# Patient Record
Sex: MALE | Race: WHITE | NOT HISPANIC OR LATINO | Employment: FULL TIME | URBAN - NONMETROPOLITAN AREA
[De-identification: names, ages, dates, MRNs, and addresses within clinical notes are randomized per-mention and may not be internally consistent; named-entity substitution may affect disease eponyms.]

---

## 2023-10-12 PROBLEM — S43.431A TEAR OF RIGHT GLENOID LABRUM: Status: ACTIVE | Noted: 2023-10-12

## 2023-10-12 PROBLEM — S46.011A TRAUMATIC INCOMPLETE TEAR OF RIGHT ROTATOR CUFF: Status: ACTIVE | Noted: 2023-10-12

## 2023-10-13 ENCOUNTER — APPOINTMENT (OUTPATIENT)
Dept: ORTHOPEDIC SURGERY | Facility: CLINIC | Age: 38
End: 2023-10-13
Payer: OTHER GOVERNMENT

## 2023-12-11 ENCOUNTER — EVALUATION (OUTPATIENT)
Dept: PHYSICAL THERAPY | Facility: CLINIC | Age: 38
End: 2023-12-11
Payer: OTHER GOVERNMENT

## 2023-12-11 DIAGNOSIS — R60.0 EDEMA OF RIGHT UPPER EXTREMITY: ICD-10-CM

## 2023-12-11 DIAGNOSIS — M25.511 RIGHT SHOULDER PAIN: Primary | ICD-10-CM

## 2023-12-11 DIAGNOSIS — M75.20 BICEPS TENDINITIS: ICD-10-CM

## 2023-12-11 DIAGNOSIS — Z98.890 STATUS POST ROTATOR CUFF REPAIR: ICD-10-CM

## 2023-12-11 PROCEDURE — 97110 THERAPEUTIC EXERCISES: CPT | Mod: GP | Performed by: PHYSICAL THERAPIST

## 2023-12-11 PROCEDURE — 97161 PT EVAL LOW COMPLEX 20 MIN: CPT | Mod: GP | Performed by: PHYSICAL THERAPIST

## 2023-12-11 ASSESSMENT — PAIN - FUNCTIONAL ASSESSMENT: PAIN_FUNCTIONAL_ASSESSMENT: 0-10

## 2023-12-11 ASSESSMENT — PAIN SCALES - GENERAL: PAINLEVEL_OUTOF10: 5 - MODERATE PAIN

## 2023-12-11 ASSESSMENT — ENCOUNTER SYMPTOMS
DEPRESSION: 0
OCCASIONAL FEELINGS OF UNSTEADINESS: 0
LOSS OF SENSATION IN FEET: 0

## 2023-12-11 NOTE — PROGRESS NOTES
Physical Therapy Evaluation and Treatment      Patient Name: Nik Cota  MRN: [id number]  Today's Date: 12/11/2023  Time Calculation  Start Time: 1610  Stop Time: 1645  Time Calculation (min): 35 min      Assessment:  PT Assessment Results: Decreased strength, Decreased range of motion, Decreased mobility, Pain  Rehab Prognosis: Good  Barriers to Participation:  (none)    The pt presents with Medical Dx of  R shoulder pain, Edema R upper extremity, S/P RTC Repair on 12/7/2023.  Pt presents with increased pain, decreased strength, ROM/flexibility and functional mobility.  Pt would benefit from PT services from PT services in order to improve on these deficits and to maximize ability to reach out with UE, scan while driving, to reach UE overhead, lift objects overhead and functional activity/mobility.      Plan:  Treatment/Interventions: Manual therapy, Therapeutic exercises, Cryotherapy, Education/ Instruction, Vasopneumatic device  PT Plan: Skilled PT  PT Frequency:  (3x/wk for 6 weeks or 18 sessions)  Rehab Potential: Good  Plan of Care Agreement: Patient    Current Problem:   Problem List Items Addressed This Visit             ICD-10-CM       Musculoskeletal and Injuries    Right shoulder pain - Primary M25.511    Relevant Orders    Follow Up In Physical Therapy    Status post rotator cuff repair Z98.890    Relevant Orders    Follow Up In Physical Therapy       Symptoms and Signs    Edema of right upper extremity R60.0    Relevant Orders    Follow Up In Physical Therapy     Other Visit Diagnoses         Codes    Biceps tendinitis     M75.20            Subjective   The pt is s/p R RTC Repair on 12/7/2023 and that his R shoulder has been pretty painful since the surgery.  The pt reports that the first couple days were very painful but that it is doing better now.  Pt reports that he ices it regularly and is taking his pain medication for pain control.      Precautions:  Precautions  Precautions Comment: No Fall  Risk.   PMH:  No significant PMH.   S/P R shoulder RTC Repair on 12/7/2023.  Follow protocol in chart.    Pain:  Pain Assessment  Pain Assessment: 0-10  Pain Score: 5 - Moderate pain (R shoulder pain range  5-9/10)  Pain Type: Surgical pain  Pain Location: Shoulder  Pain Orientation: Right    Home Living:   Pt lives with wife in a 2 story home.  1 flt of steps with a rail.      Prior Level of Function:   Pt was I with all ADL's and IADL's prior.  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       Objective   Observation:   FHP with rounded shoulders.  Scope/incision sites are healing well without drainage or signs of infection.       Palpation:  Tender R anterior shoulder and near scope/incision sites.      Strength:                                                                     R-   Flex   NT/5   Abd   NT/5   ER   NT/5   IR   NT/5   Bicept   5/5   Tricept   5/5       L-   Flex   5/5   Abd   5/5   ER   5/5   IR   5/5   Bicept   5/5   Tricept   5/5      PROM:                 R-   Flex  95 deg     Abd NT  deg     ER  35 deg     IR NT  deg       L-   WNL Throughout.    Special Tests:   N/A              Outcome Measures:  Other Measures  Other Outcome Measures: Quick Dash  90.91     Treatments:  Ther Ex   10 min   1 unit  PROM R shoulder  10 min  HEP instruction.    OP EDUCATION:   PT edu pt regarding PT POC/course of treatment and HEP.  Pt verbalized good understanding.      Goals:  Active       PT Problem       PT Goal 1       Start:  12/11/23    Expected End:  02/09/24       LTG's:  1) Improve  R shoulder strength to >= 4+/5 throughout in order to facilitate ability to reach overhead and lift objects as protocol allows.     4-6 weeks      2) Improve  R shoulder PROM to >= 165 deg flex/abd and 80 deg ER/IR in order to better reach overhead or behind back as protocol allows.       4-6 weeks      3) Improve R shoulder pain from   9/10 to <= 2-3/10 with activity in order to improve QOL.        4-6  weeks      4) Improve quick dash score by >= 5 points in order to improve QOL.    4-6 weeks      5) The pt will improve ability to raise arm/shoulder overhead, reach at various angles, lift/carry objects, dress upper body, overhead activity and return to exercise and work without significant limitation as protocol allows.  4-6 weeks    ST) Pt/caregiver will be I and consistent with HEP with use of handout as needed in order to maximize shoulder strength and ROM/flexibility.       2-3 weeks

## 2023-12-12 ENCOUNTER — TREATMENT (OUTPATIENT)
Dept: PHYSICAL THERAPY | Facility: CLINIC | Age: 38
End: 2023-12-12
Payer: OTHER GOVERNMENT

## 2023-12-12 DIAGNOSIS — Z98.890 STATUS POST ROTATOR CUFF REPAIR: ICD-10-CM

## 2023-12-12 DIAGNOSIS — R60.0 EDEMA OF RIGHT UPPER EXTREMITY: ICD-10-CM

## 2023-12-12 DIAGNOSIS — M25.511 RIGHT SHOULDER PAIN: ICD-10-CM

## 2023-12-12 PROCEDURE — 97140 MANUAL THERAPY 1/> REGIONS: CPT | Mod: GP,CQ

## 2023-12-12 PROCEDURE — 97110 THERAPEUTIC EXERCISES: CPT | Mod: GP,CQ

## 2023-12-12 ASSESSMENT — PAIN - FUNCTIONAL ASSESSMENT: PAIN_FUNCTIONAL_ASSESSMENT: 0-10

## 2023-12-12 ASSESSMENT — PAIN SCALES - GENERAL: PAINLEVEL_OUTOF10: 4

## 2023-12-12 NOTE — PROGRESS NOTES
Physical Therapy Treatment    Patient Name: Nik Cota  MRN: [id number]  Today's Date: 2023  Time Calculation  Start Time: 1645  Stop Time: 1730  Time Calculation (min): 45 min      Assessment: Patient identified by name and . HEP given for pendulums and scap clocks. Patient did well with passive movement with pendulums, some guarding noted with patient expressing apprehension about allowing UE to move. STW to UT and mid traps, increased tissue resistance noted.        Plan:  Plan to continue with gentle PROM and scapular engagement to allow for ease with sleeping. JW       Current Problem  1. Right shoulder pain  Follow Up In Physical Therapy      2. Status post rotator cuff repair  Follow Up In Physical Therapy      3. Edema of right upper extremity  Follow Up In Physical Therapy          Subjective   Patient reports that his pain levels of 4/10 in R shoulder. States that he has been doing pendulums.      Precautions  Precautions  Precautions Comment: No Fall Risk.   PMH:  No significant PMH.   S/P R shoulder RTC Repair on 2023.  Follow protocol in chart.  Pain  Pain Assessment: 0-10  Pain Score: 4  Pain Location: Shoulder  Pain Orientation: Right    Treatments:   TE: 10'  Pendulums 2x10 ea  Scap clocks x10 N  Wrist flex/ext/sup/pro x10 ea    Manual 28'  PROM R shoulder: flex/ abd/ IR/ER  STW to R UT/mid scap      OP EDUCATION:   Access Code: [email or web address]  URL: [email or web address]  Date: 2023  Prepared by: Oliva Longo    Exercises  - Circular Shoulder Pendulum with Table Support  - 1 x daily - 7 x weekly - 3 sets - 10 reps  - Forearm Strengthening with Ball Squeeze  - 1 x daily - 7 x weekly - 3 sets - 10 reps  - Flexion-Extension Shoulder Pendulum with Table Support  - 1 x daily - 7 x weekly - 3 sets - 10 reps  - Seated Scapular Clock (1 to 7)  - 1 x daily - 7 x weekly - 3 sets - 10 reps    Goals:  Active       PT Problem       PT Goal 1       Start:  23     Expected End:  24       LTG's:  1) Improve  R shoulder strength to >= 4+/5 throughout in order to facilitate ability to reach overhead and lift objects as protocol allows.     4-6 weeks      2) Improve  R shoulder PROM to >= 165 deg flex/abd and 80 deg ER/IR in order to better reach overhead or behind back as protocol allows.       4-6 weeks      3) Improve R shoulder pain from   9/10 to <= 2-3/10 with activity in order to improve QOL.        4-6 weeks      4) Improve quick dash score by >= 5 points in order to improve QOL.    4-6 weeks      5) The pt will improve ability to raise arm/shoulder overhead, reach at various angles, lift/carry objects, dress upper body, overhead activity and return to exercise and work without significant limitation as protocol allows.  4-6 weeks    ST) Pt/caregiver will be I and consistent with HEP with use of handout as needed in order to maximize shoulder strength and ROM/flexibility.       2-3 weeks

## 2023-12-15 ENCOUNTER — TREATMENT (OUTPATIENT)
Dept: PHYSICAL THERAPY | Facility: CLINIC | Age: 38
End: 2023-12-15
Payer: OTHER GOVERNMENT

## 2023-12-15 DIAGNOSIS — M25.511 RIGHT SHOULDER PAIN: ICD-10-CM

## 2023-12-15 DIAGNOSIS — R60.0 EDEMA OF RIGHT UPPER EXTREMITY: ICD-10-CM

## 2023-12-15 DIAGNOSIS — Z98.890 STATUS POST ROTATOR CUFF REPAIR: ICD-10-CM

## 2023-12-15 PROCEDURE — 97140 MANUAL THERAPY 1/> REGIONS: CPT | Mod: GP,CQ | Performed by: PHYSICAL THERAPY ASSISTANT

## 2023-12-15 PROCEDURE — 97110 THERAPEUTIC EXERCISES: CPT | Mod: GP,CQ | Performed by: PHYSICAL THERAPY ASSISTANT

## 2023-12-15 ASSESSMENT — PAIN - FUNCTIONAL ASSESSMENT: PAIN_FUNCTIONAL_ASSESSMENT: 0-10

## 2023-12-15 NOTE — PROGRESS NOTES
Physical Therapy Treatment    Patient Name: Nik Cota  MRN: [id number]  Today's Date: 12/15/2023  Time Calculation  Start Time: 0830  Stop Time: 0915  Time Calculation (min): 45 min      Assessment: Patient tolerated treatment without increased pain. Patient reports compliance with HEP daily.  Performed PROM and STW to right shoulder per protocol. Continue with right shoulder ROM per protocol.       Plan:   Continue with right shoulder ROM to improve mobility, decrease pain and improve sleeping/ADL.    Current Problem  Problem List Items Addressed This Visit             ICD-10-CM       Musculoskeletal and Injuries    Right shoulder pain M25.511    Status post rotator cuff repair Z98.890       Symptoms and Signs    Edema of right upper extremity R60.0           Reason for Referral: t  Referred By: Manuel  Precautions  Precautions  Precautions Comment: No Fall risk  Pain  Pain Assessment: 0-10  Pain Location: Shoulder  Pain Orientation: Right    Treatments:  Physical Therapy Treatment    Patient Name: Nik Cota  MRN: [id number]  Today's Date: 12/15/2023  Time Calculation  Start Time: 0830  Stop Time: 0915  Time Calculation (min): 45 min      Assessment: Patient identified by name and . HEP given for pendulums and scap clocks. Patient did well with passive movement with pendulums, some guarding noted with patient expressing apprehension about allowing UE to move. STW to UT and mid traps, increased tissue resistance noted.        Plan:  Plan to continue with gentle PROM and scapular engagement to allow for ease with sleeping. JW       Current Problem  1. Right shoulder pain  Follow Up In Physical Therapy      2. Status post rotator cuff repair  Follow Up In Physical Therapy      3. Edema of right upper extremity  Follow Up In Physical Therapy          Subjective Patient reports 2/10 pain in right shoulder. Reports sleeping in bed now with right shoulder propped.    Reason for Referral: t  Referred By:  Manuel  Precautions  Precautions  Precautions Comment: No Fall risk  Pain  Patient reports 2/10 right shoulder.    Treatments:   TE: 10'  Pendulums 2x10 ea  Scap clocks x10   Wrist flex/ext/sup/pro x10 ea    Manual 28'  PROM R shoulder: flex/ abd/ IR/ER  STW to R UT/mid scap      OP EDUCATION:   Access Code: [email or web address]  URL: [email or web address]  Date: 2023  Prepared by:     Exercises  - Circular Shoulder Pendulum with Table Support  - 1 x daily - 7 x weekly - 3 sets - 10 reps  - Forearm Strengthening with Ball Squeeze  - 1 x daily - 7 x weekly - 3 sets - 10 reps  - Flexion-Extension Shoulder Pendulum with Table Support  - 1 x daily - 7 x weekly - 3 sets - 10 reps  - Seated Scapular Clock (1 to 7)  - 1 x daily - 7 x weekly - 3 sets - 10 reps    Goals:  Active       PT Problem       PT Goal 1       Start:  23    Expected End:  24       LTG's:  1) Improve  R shoulder strength to >= 4+/5 throughout in order to facilitate ability to reach overhead and lift objects as protocol allows.     4-6 weeks      2) Improve  R shoulder PROM to >= 165 deg flex/abd and 80 deg ER/IR in order to better reach overhead or behind back as protocol allows.       4-6 weeks      3) Improve R shoulder pain from   9/10 to <= 2-3/10 with activity in order to improve QOL.        4-6 weeks      4) Improve quick dash score by >= 5 points in order to improve QOL.    4-6 weeks      5) The pt will improve ability to raise arm/shoulder overhead, reach at various angles, lift/carry objects, dress upper body, overhead activity and return to exercise and work without significant limitation as protocol allows.  4-6 weeks    ST) Pt/caregiver will be I and consistent with HEP with use of handout as needed in order to maximize shoulder strength and ROM/flexibility.       2-3 weeks                   Goals:  Active       PT Problem       PT Goal 1       Start:  23    Expected End:  24       LTG's:  1)  Improve  R shoulder strength to >= 4+/5 throughout in order to facilitate ability to reach overhead and lift objects as protocol allows.     4-6 weeks      2) Improve  R shoulder PROM to >= 165 deg flex/abd and 80 deg ER/IR in order to better reach overhead or behind back as protocol allows.       4-6 weeks      3) Improve R shoulder pain from   9/10 to <= 2-3/10 with activity in order to improve QOL.        4-6 weeks      4) Improve quick dash score by >= 5 points in order to improve QOL.    4-6 weeks      5) The pt will improve ability to raise arm/shoulder overhead, reach at various angles, lift/carry objects, dress upper body, overhead activity and return to exercise and work without significant limitation as protocol allows.  4-6 weeks    ST) Pt/caregiver will be I and consistent with HEP with use of handout as needed in order to maximize shoulder strength and ROM/flexibility.       2-3 weeks

## 2023-12-18 ENCOUNTER — TREATMENT (OUTPATIENT)
Dept: PHYSICAL THERAPY | Facility: CLINIC | Age: 38
End: 2023-12-18
Payer: OTHER GOVERNMENT

## 2023-12-18 DIAGNOSIS — R60.0 EDEMA OF RIGHT UPPER EXTREMITY: ICD-10-CM

## 2023-12-18 DIAGNOSIS — Z98.890 STATUS POST ROTATOR CUFF REPAIR: ICD-10-CM

## 2023-12-18 DIAGNOSIS — M25.511 ACUTE PAIN OF RIGHT SHOULDER: Primary | ICD-10-CM

## 2023-12-18 DIAGNOSIS — M25.511 RIGHT SHOULDER PAIN: ICD-10-CM

## 2023-12-18 PROCEDURE — 97140 MANUAL THERAPY 1/> REGIONS: CPT | Mod: GP,CQ

## 2023-12-18 PROCEDURE — 97110 THERAPEUTIC EXERCISES: CPT | Mod: GP,CQ

## 2023-12-18 ASSESSMENT — PAIN - FUNCTIONAL ASSESSMENT: PAIN_FUNCTIONAL_ASSESSMENT: 0-10

## 2023-12-18 ASSESSMENT — PAIN SCALES - GENERAL: PAINLEVEL_OUTOF10: 0 - NO PAIN

## 2023-12-18 NOTE — PROGRESS NOTES
"Physical Therapy Treatment    Patient Name: Nik Cota  MRN: [id number]  Today's Date: 12/18/2023  Time Calculation  Start Time: 0702  Stop Time: 0746  Time Calculation (min): 44 min      Assessment:   Patient identified by name and date of birth. Patient demonstrated good understanding of exercises and preformed with little to no cues to perform correctly. He demonstrated good tolerance with PROM with cues at times to relax. Added UT stretch due to increased muscle tension.     Plan:  OP PT Plan  Treatment/Interventions: Manual therapy, Therapeutic exercises, Cryotherapy, Education/ Instruction, Vasopneumatic device  PT Plan: Skilled PT  PT Frequency:  (3x/wk for 6 weeks or 18 sessions)  Rehab Potential: Good  Plan of Care Agreement: Patient    Progress with ROM and strengthening per protocol for increased ease with ADL's. AW  Current Problem  Problem List Items Addressed This Visit             ICD-10-CM       Musculoskeletal and Injuries    Right shoulder pain - Primary M25.511    Status post rotator cuff repair Z98.890       Symptoms and Signs    Edema of right upper extremity R60.0       Subjective  Patient reported compliance with % of the time. He reported 0/10 pain after treatment.     Precautions  Precautions  Precautions Comment: No Fall risk,s/p R RTC Repair on 12/7/2023      Pain  Pain Assessment: 0-10  Pain Score: 0 - No pain  Pain Location: Shoulder  Pain Orientation: Right     Treatments:  Therapeutic Exercise  Therapeutic Exercise Performed: Yes  Pendulums 2x10 ea  Scap clocks 2x10   Wrist flex/ext/sup/pro x10 ea  UT stretch 3 x 20\"     Manual   PROM R shoulder: flex/ abd/ IR/ER  STW to R UT/mid scap     OP EDUCATION:   Reviewed he verbalized good understanding.   Access Code: [email or web address]  URL: [email or web address]  Date: 12/12/2023  Prepared by:      Exercises  - Circular Shoulder Pendulum with Table Support  - 1 x daily - 7 x weekly - 3 sets - 10 reps  - Forearm " Strengthening with Ball Squeeze  - 1 x daily - 7 x weekly - 3 sets - 10 reps  - Flexion-Extension Shoulder Pendulum with Table Support  - 1 x daily - 7 x weekly - 3 sets - 10 reps  - Seated Scapular Clock (1 to 7)  - 1 x daily - 7 x weekly - 3 sets - 10 reps    Goals:  Active       PT Problem       PT Goal 1       Start:  23    Expected End:  24       LTG's:  1) Improve  R shoulder strength to >= 4+/5 throughout in order to facilitate ability to reach overhead and lift objects as protocol allows.     4-6 weeks      2) Improve  R shoulder PROM to >= 165 deg flex/abd and 80 deg ER/IR in order to better reach overhead or behind back as protocol allows.       4-6 weeks      3) Improve R shoulder pain from   9/10 to <= 2-3/10 with activity in order to improve QOL.        4-6 weeks      4) Improve quick dash score by >= 5 points in order to improve QOL.    4-6 weeks      5) The pt will improve ability to raise arm/shoulder overhead, reach at various angles, lift/carry objects, dress upper body, overhead activity and return to exercise and work without significant limitation as protocol allows.  4-6 weeks    ST) Pt/caregiver will be I and consistent with HEP with use of handout as needed in order to maximize shoulder strength and ROM/flexibility.       2-3 weeks

## 2023-12-20 ENCOUNTER — TREATMENT (OUTPATIENT)
Dept: PHYSICAL THERAPY | Facility: CLINIC | Age: 38
End: 2023-12-20
Payer: OTHER GOVERNMENT

## 2023-12-20 DIAGNOSIS — M25.511 RIGHT SHOULDER PAIN: ICD-10-CM

## 2023-12-20 DIAGNOSIS — Z98.890 STATUS POST ROTATOR CUFF REPAIR: ICD-10-CM

## 2023-12-20 DIAGNOSIS — R60.0 EDEMA OF RIGHT UPPER EXTREMITY: ICD-10-CM

## 2023-12-20 PROCEDURE — 97110 THERAPEUTIC EXERCISES: CPT | Mod: GP,CQ

## 2023-12-20 PROCEDURE — 97140 MANUAL THERAPY 1/> REGIONS: CPT | Mod: GP,CQ

## 2023-12-20 PROCEDURE — 97016 VASOPNEUMATIC DEVICE THERAPY: CPT | Mod: GP,CQ

## 2023-12-20 ASSESSMENT — PAIN SCALES - GENERAL: PAINLEVEL_OUTOF10: 1

## 2023-12-20 ASSESSMENT — PAIN - FUNCTIONAL ASSESSMENT: PAIN_FUNCTIONAL_ASSESSMENT: 0-10

## 2023-12-20 NOTE — PROGRESS NOTES
"Physical Therapy Treatment    Patient Name: Nik Cota  MRN: [id number]  Today's Date: 12/20/2023  Time Calculation  Start Time: 0747  Stop Time: 0840  Time Calculation (min): 53 min      Assessment:   Patient able to tolerate treatment with mild difficulty. Able to tolerate PROM and STM with no increased symptoms, and demo's good form with exercises.     Plan:  Continue to work on improving ROM to be able to progress with protocol as tolerated. -AB.     OP PT Plan  Treatment/Interventions: Manual therapy, Therapeutic exercises, Cryotherapy, Education/ Instruction, Vasopneumatic device  PT Plan: Skilled PT  PT Frequency:  (3x/wk for 6 weeks or 18 sessions)  Rehab Potential: Good  Plan of Care Agreement: Patient    Current Problem  Problem List Items Addressed This Visit             ICD-10-CM    Right shoulder pain M25.511    Status post rotator cuff repair Z98.890    Edema of right upper extremity R60.0       Subjective   General   Patient states that he took his last pain medication either Sunday evening or Monday. States that he hasn't really needed it. States that the day after surgery was pretty painful. States that he saw the dr yesterday, and states that everything is healing nicely.    Precautions  Precautions  Precautions Comment: No Fall risk,s/p R RTC Repair on 12/7/2023    Pain  Pain Assessment: 0-10  Pain Score: 1  Pain Location: Shoulder  Pain Orientation: Right      Treatments:  Therapeutic Exercise: 13 minutes, 1 units  Pendulums 2x10 ea  Scap clocks 2x10   Wrist flex/ext/sup/pro x10 ea  UT stretch 3 x 20\"     Manual Therapy: 25 minutes, 2 units   PROM R shoulder: flex/ abd/ IR/ER  STW to R UT/mid scap    Modalities: 10 minutes, 1 units  Gameready medium compression x10' to R shoulder (N)    OP EDUCATION:    Reviewed he verbalized good understanding.   Access Code: [email or web address]  URL: [email or web address]  Date: 12/12/2023  Prepared by:      Exercises  - Circular Shoulder Pendulum " with Table Support  - 1 x daily - 7 x weekly - 3 sets - 10 reps  - Forearm Strengthening with Ball Squeeze  - 1 x daily - 7 x weekly - 3 sets - 10 reps  - Flexion-Extension Shoulder Pendulum with Table Support  - 1 x daily - 7 x weekly - 3 sets - 10 reps  - Seated Scapular Clock (1 to 7)  - 1 x daily - 7 x weekly - 3 sets - 10 reps       Goals:  Active       PT Problem       PT Goal 1       Start:  23    Expected End:  24       LTG's:  1) Improve  R shoulder strength to >= 4+/5 throughout in order to facilitate ability to reach overhead and lift objects as protocol allows.     4-6 weeks      2) Improve  R shoulder PROM to >= 165 deg flex/abd and 80 deg ER/IR in order to better reach overhead or behind back as protocol allows.       4-6 weeks      3) Improve R shoulder pain from   9/10 to <= 2-3/10 with activity in order to improve QOL.        4-6 weeks      4) Improve quick dash score by >= 5 points in order to improve QOL.    4-6 weeks      5) The pt will improve ability to raise arm/shoulder overhead, reach at various angles, lift/carry objects, dress upper body, overhead activity and return to exercise and work without significant limitation as protocol allows.  4-6 weeks    ST) Pt/caregiver will be I and consistent with HEP with use of handout as needed in order to maximize shoulder strength and ROM/flexibility.       2-3 weeks

## 2023-12-22 ENCOUNTER — TREATMENT (OUTPATIENT)
Dept: PHYSICAL THERAPY | Facility: CLINIC | Age: 38
End: 2023-12-22
Payer: OTHER GOVERNMENT

## 2023-12-22 DIAGNOSIS — M25.511 RIGHT SHOULDER PAIN: ICD-10-CM

## 2023-12-22 DIAGNOSIS — M25.511 ACUTE PAIN OF RIGHT SHOULDER: Primary | ICD-10-CM

## 2023-12-22 DIAGNOSIS — Z98.890 STATUS POST ROTATOR CUFF REPAIR: ICD-10-CM

## 2023-12-22 DIAGNOSIS — R60.0 EDEMA OF RIGHT UPPER EXTREMITY: ICD-10-CM

## 2023-12-22 PROCEDURE — 97016 VASOPNEUMATIC DEVICE THERAPY: CPT | Mod: GP,CQ

## 2023-12-22 PROCEDURE — 97110 THERAPEUTIC EXERCISES: CPT | Mod: GP,CQ

## 2023-12-22 PROCEDURE — 97140 MANUAL THERAPY 1/> REGIONS: CPT | Mod: GP,CQ

## 2023-12-22 ASSESSMENT — PAIN - FUNCTIONAL ASSESSMENT: PAIN_FUNCTIONAL_ASSESSMENT: 0-10

## 2023-12-22 ASSESSMENT — PAIN SCALES - GENERAL: PAINLEVEL_OUTOF10: 0 - NO PAIN

## 2023-12-22 NOTE — PROGRESS NOTES
"Physical Therapy Treatment    Patient Name: Nik Cota  MRN: [id number]  Today's Date: 12/22/2023  Time Calculation  Start Time: 0748  Stop Time: 0834  Time Calculation (min): 46 min      Assessment:   Patient identified by name and birth date. STM completed to reduce pain and soft tissue restriction in  R shoulder musculature. After manual therapy he reported decreased tightness.           Plan:  Continue with manual therapy to reduce soft tissue restriction /pain and ROM per protocol to allow improved R shoulder mobility for ease with self care ADLs. -CG.          Current Problem  Problem List Items Addressed This Visit             ICD-10-CM       Musculoskeletal and Injuries    Right shoulder pain - Primary M25.511    Status post rotator cuff repair Z98.890       Symptoms and Signs    Edema of right upper extremity R60.0       Subjective He denies pain this session and reports over all just tightness limiting his function.       Precautions  Precautions  Precautions Comment: No Fall risk,s/p R RTC Repair on 12/7/2023    Pain  Pain Assessment: 0-10  Pain Score: 0 - No pain  Pain Location: Shoulder  Pain Orientation: Right      Treatments:  Therapeutic Exercise:   Pendulums 2x10 ea  Scap clocks 2x10   Wrist flex/ext/sup/pro x10 ea  UT stretch 3 x 20\"     Manual Therapy:   PROM R shoulder: flex/ abd/ IR/ER  STW to R UT/mid scap    Modalities: 10 minutes, 1 units  Gameready medium compression x10' to R shoulder     OP EDUCATION:    Reviewed he verbalized good understanding.   Access Code: [email or web address]  URL: [email or web address]  Date: 12/12/2023  Prepared by:      Exercises  - Circular Shoulder Pendulum with Table Support  - 1 x daily - 7 x weekly - 3 sets - 10 reps  - Forearm Strengthening with Ball Squeeze  - 1 x daily - 7 x weekly - 3 sets - 10 reps  - Flexion-Extension Shoulder Pendulum with Table Support  - 1 x daily - 7 x weekly - 3 sets - 10 reps  - Seated Scapular Clock (1 to 7)  - 1 x " daily - 7 x weekly - 3 sets - 10 reps       Goals:  Active       PT Problem       PT Goal 1       Start:  23    Expected End:  24       LTG's:  1) Improve  R shoulder strength to >= 4+/5 throughout in order to facilitate ability to reach overhead and lift objects as protocol allows.     4-6 weeks      2) Improve  R shoulder PROM to >= 165 deg flex/abd and 80 deg ER/IR in order to better reach overhead or behind back as protocol allows.       4-6 weeks      3) Improve R shoulder pain from   /10 to <= 2-3/10 with activity in order to improve QOL.        4-6 weeks      4) Improve quick dash score by >= 5 points in order to improve QOL.    4-6 weeks      5) The pt will improve ability to raise arm/shoulder overhead, reach at various angles, lift/carry objects, dress upper body, overhead activity and return to exercise and work without significant limitation as protocol allows.  4-6 weeks    ST) Pt/caregiver will be I and consistent with HEP with use of handout as needed in order to maximize shoulder strength and ROM/flexibility.       2-3 weeks

## 2023-12-28 ENCOUNTER — TREATMENT (OUTPATIENT)
Dept: PHYSICAL THERAPY | Facility: CLINIC | Age: 38
End: 2023-12-28
Payer: OTHER GOVERNMENT

## 2023-12-28 DIAGNOSIS — Z98.890 STATUS POST ROTATOR CUFF REPAIR: ICD-10-CM

## 2023-12-28 DIAGNOSIS — M25.511 RIGHT SHOULDER PAIN: ICD-10-CM

## 2023-12-28 DIAGNOSIS — R60.0 EDEMA OF RIGHT UPPER EXTREMITY: ICD-10-CM

## 2023-12-28 PROCEDURE — 97110 THERAPEUTIC EXERCISES: CPT | Mod: GP

## 2023-12-28 PROCEDURE — 97140 MANUAL THERAPY 1/> REGIONS: CPT | Mod: GP

## 2023-12-28 ASSESSMENT — PAIN SCALES - GENERAL: PAINLEVEL_OUTOF10: 2

## 2023-12-28 ASSESSMENT — PAIN - FUNCTIONAL ASSESSMENT: PAIN_FUNCTIONAL_ASSESSMENT: 0-10

## 2023-12-28 NOTE — PROGRESS NOTES
"Physical Therapy Treatment    Patient Name: Nik Cota  MRN: [id number]  Today's Date: 2023       Current Problem  Problem List Items Addressed This Visit             ICD-10-CM    Right shoulder pain M25.511    Status post rotator cuff repair Z98.890    Edema of right upper extremity R60.0       Assessment:Patient identity confirmed today with name/. Added to HEP and given handout    Plan:continue with ROM/PRE per protocol    Subjective: 2/10 discomfort around my (bicep) area    Precautions  Precautions  Precautions Comment: No Fall risk,s/p R RTC Repair on 2023    Pain  Pain Assessment: 0-10  Pain Score: 2  Pain Location: Shoulder  Pain Orientation: Right (and anterior upper arm)    Treatments:  Therapeutic Exercise:   Pendulums 2x10 ea  Scap clocks 2x10   Wrist flex/ext/sup/pro x10 ea  UT stretch 3 x 20\" (seated)  Wand ER stretch (supine)  Manual Therapy:   Poterior/inferir GH glides 1-2 N  PROM R shoulder: flex/ abd/ IR/ER  STW to R UT/mid scap X    Modalities: 15 minutes, 1 units  Gameready medium compression x15' to R shoulder   OP EDUCATION:       Goals:  Active       PT Problem       PT Goal 1       Start:  23    Expected End:  24       LTG's:  1) Improve  R shoulder strength to >= 4+/5 throughout in order to facilitate ability to reach overhead and lift objects as protocol allows.     4-6 weeks      2) Improve  R shoulder PROM to >= 165 deg flex/abd and 80 deg ER/IR in order to better reach overhead or behind back as protocol allows.       4-6 weeks      3) Improve R shoulder pain from   9/10 to <= 2-3/10 with activity in order to improve QOL.        4-6 weeks      4) Improve quick dash score by >= 5 points in order to improve QOL.    4-6 weeks      5) The pt will improve ability to raise arm/shoulder overhead, reach at various angles, lift/carry objects, dress upper body, overhead activity and return to exercise and work without significant limitation as protocol allows.  4-6 " weeks    ST) Pt/caregiver will be I and consistent with HEP with use of handout as needed in order to maximize shoulder strength and ROM/flexibility.       2-3 weeks

## 2024-01-02 ENCOUNTER — TREATMENT (OUTPATIENT)
Dept: PHYSICAL THERAPY | Facility: CLINIC | Age: 39
End: 2024-01-02
Payer: OTHER GOVERNMENT

## 2024-01-02 DIAGNOSIS — Z98.890 STATUS POST ROTATOR CUFF REPAIR: ICD-10-CM

## 2024-01-02 DIAGNOSIS — M25.511 RIGHT SHOULDER PAIN: ICD-10-CM

## 2024-01-02 DIAGNOSIS — R60.0 EDEMA OF RIGHT UPPER EXTREMITY: ICD-10-CM

## 2024-01-02 PROCEDURE — 97110 THERAPEUTIC EXERCISES: CPT | Mod: GP,CQ

## 2024-01-02 ASSESSMENT — PAIN - FUNCTIONAL ASSESSMENT: PAIN_FUNCTIONAL_ASSESSMENT: 0-10

## 2024-01-02 ASSESSMENT — PAIN DESCRIPTION - DESCRIPTORS: DESCRIPTORS: ACHING;DISCOMFORT

## 2024-01-02 ASSESSMENT — PAIN SCALES - GENERAL: PAINLEVEL_OUTOF10: 2

## 2024-01-02 NOTE — PROGRESS NOTES
"Physical Therapy Treatment    Patient Name: Nik Cota  MRN: [id number]  Today's Date: 1/2/2024  Time Calculation  Start Time: 0830  Stop Time: 0908  Time Calculation (min): 38 min      Assessment:  Pt. Continues with tightness with upper trap stretches, R>L.  End range tension noted with PROM in all directions, especially ER.  Pt. Reports shoulder feels sore but looser following session.    Plan:  Continue with ROM/PRE per protocol to improve daily activities with greater ease.  MB     Treatment/Interventions: Manual therapy, Therapeutic exercises, Cryotherapy, Education/ Instruction, Vasopneumatic device  PT Plan: Skilled PT  PT Frequency:  (3x/wk for 6 weeks or 18 sessions)  Rehab Potential: Good  Plan of Care Agreement: Patient    Current Problem  Problem List Items Addressed This Visit             ICD-10-CM    Right shoulder pain M25.511    Status post rotator cuff repair Z98.890    Edema of right upper extremity R60.0       Subjective   Pt. States shoulder/upper arm is very sore and feels uncomfortable.  Continues with difficulty with sleeping at night.     Precautions  Precautions  Precautions Comment: No Fall risk,s/p R RTC Repair on 12/7/2023    Pain  Pain Assessment: 0-10  Pain Score: 2  Pain Location: Shoulder  Pain Orientation: Right  Pain Descriptors: Aching, Discomfort    Treatments:  TE: 25 mins/2 units   Therapeutic Exercise:   Pendulums 2x10 ea  Scap clocks 2x10   Wrist flex/ext/sup/pro x10 ea  UT stretch 3 x 20\" (seated)  Wand ER stretch (supine)    Manual Therapy: 10 mins/1 unit  Poterior/inferir GH glides 1-2 N  PROM R shoulder: flex/ abd/ IR/ER  STW to R UT/mid scap X     Modalities: 15 minutes, 1 units  (x)  Gameready medium compression x15' to R shoulder     OP EDUCATION:       Goals:  Active       PT Problem       PT Goal 1       Start:  12/11/23    Expected End:  02/09/24       LTG's:  1) Improve  R shoulder strength to >= 4+/5 throughout in order to facilitate ability to reach overhead and " lift objects as protocol allows.     4-6 weeks      2) Improve  R shoulder PROM to >= 165 deg flex/abd and 80 deg ER/IR in order to better reach overhead or behind back as protocol allows.       4-6 weeks      3) Improve R shoulder pain from   9/10 to <= 2-3/10 with activity in order to improve QOL.        4-6 weeks      4) Improve quick dash score by >= 5 points in order to improve QOL.    4-6 weeks      5) The pt will improve ability to raise arm/shoulder overhead, reach at various angles, lift/carry objects, dress upper body, overhead activity and return to exercise and work without significant limitation as protocol allows.  4-6 weeks    ST) Pt/caregiver will be I and consistent with HEP with use of handout as needed in order to maximize shoulder strength and ROM/flexibility.       2-3 weeks

## 2024-01-04 ENCOUNTER — TREATMENT (OUTPATIENT)
Dept: PHYSICAL THERAPY | Facility: CLINIC | Age: 39
End: 2024-01-04
Payer: OTHER GOVERNMENT

## 2024-01-04 DIAGNOSIS — M25.511 RIGHT SHOULDER PAIN: ICD-10-CM

## 2024-01-04 DIAGNOSIS — R60.0 EDEMA OF RIGHT UPPER EXTREMITY: ICD-10-CM

## 2024-01-04 DIAGNOSIS — Z98.890 STATUS POST ROTATOR CUFF REPAIR: ICD-10-CM

## 2024-01-04 PROCEDURE — 97140 MANUAL THERAPY 1/> REGIONS: CPT | Mod: GP,CQ

## 2024-01-04 PROCEDURE — 97110 THERAPEUTIC EXERCISES: CPT | Mod: GP,CQ

## 2024-01-04 ASSESSMENT — PAIN SCALES - GENERAL: PAINLEVEL_OUTOF10: 2

## 2024-01-04 ASSESSMENT — PAIN DESCRIPTION - DESCRIPTORS: DESCRIPTORS: ACHING;DISCOMFORT

## 2024-01-04 ASSESSMENT — PAIN - FUNCTIONAL ASSESSMENT: PAIN_FUNCTIONAL_ASSESSMENT: 0-10

## 2024-01-04 NOTE — PROGRESS NOTES
"Physical Therapy Treatment    Patient Name: Nik Cota  MRN: [id number]  Today's Date: 2024  Time Calculation  Start Time: 704  Stop Time: 746  Time Calculation (min): 42 min      Assessment:   Patient identified with name and .   No change in pain level pre-treatment as compared to previous session, however patient did note less pain after stretching today. Still using sling/brace. Icing at home about every day. Deferred Game Ready machine today secondary to time constraints -> patient will ice shoulder at home.     Plan:   Continue with ROM as per protocol to improve functional motion in shoulder, easing difficulty with ADLs.     Treatment/Interventions: Manual therapy, Therapeutic exercises, Cryotherapy, Education/ Instruction, Vasopneumatic device  PT Plan: Skilled PT  PT Frequency:  (3x/wk for 6 weeks or 18 sessions)  Rehab Potential: Good  Plan of Care Agreement: Patient       Current Problem  Problem List Items Addressed This Visit             ICD-10-CM    Right shoulder pain M25.511    Status post rotator cuff repair Z98.890    Edema of right upper extremity R60.0       Subjective   Moved real quick to catch phone from falling this morning and it caused some pain in the right shoulder. It does feel better after being stretched.     Precautions  Precautions  Precautions Comment: No Fall risk,s/p R RTC Repair on 2023    Pain  Pain Assessment: 0-10  Pain Score: 2  Pain Location: Shoulder  Pain Orientation: Right  Pain Descriptors: Aching, Discomfort    Treatments:  Therapeutic Exercise  Therapeutic Exercise Performed: Yes  Therapeutic Exercise: 15 minutes, 1 units   Pendulums 2x10 ea  Scap clocks 2x10   Wrist flex/ext/sup/pro x10 ea  UT stretch 3 x 20\" (seated)  Wand ER stretch (supine)    Manual Therapy  Manual Therapy Performed: Yes  Manual Therapy: 25 minutes, 2 units   PROM R shoulder: flex/ abd/ IR/ER  STW to R UT/mid scap X    OP EDUCATION:   Reviewed positioning and use of sling to protect " shoulder. Discussed using ice at home, daily, to help manage edema and soreness.     Goals:  Active       PT Problem       PT Goal 1       Start:  23    Expected End:  24       LTG's:  1) Improve  R shoulder strength to >= 4+/5 throughout in order to facilitate ability to reach overhead and lift objects as protocol allows.     4-6 weeks      2) Improve  R shoulder PROM to >= 165 deg flex/abd and 80 deg ER/IR in order to better reach overhead or behind back as protocol allows.       4-6 weeks      3) Improve R shoulder pain from   9/10 to <= 2-3/10 with activity in order to improve QOL.        4-6 weeks      4) Improve quick dash score by >= 5 points in order to improve QOL.    4-6 weeks      5) The pt will improve ability to raise arm/shoulder overhead, reach at various angles, lift/carry objects, dress upper body, overhead activity and return to exercise and work without significant limitation as protocol allows.  4-6 weeks    ST) Pt/caregiver will be I and consistent with HEP with use of handout as needed in order to maximize shoulder strength and ROM/flexibility.       2-3 weeks

## 2024-01-08 ENCOUNTER — TREATMENT (OUTPATIENT)
Dept: PHYSICAL THERAPY | Facility: CLINIC | Age: 39
End: 2024-01-08
Payer: OTHER GOVERNMENT

## 2024-01-08 DIAGNOSIS — M25.511 RIGHT SHOULDER PAIN: ICD-10-CM

## 2024-01-08 DIAGNOSIS — Z98.890 STATUS POST ROTATOR CUFF REPAIR: ICD-10-CM

## 2024-01-08 DIAGNOSIS — R60.0 EDEMA OF RIGHT UPPER EXTREMITY: ICD-10-CM

## 2024-01-08 PROCEDURE — 97140 MANUAL THERAPY 1/> REGIONS: CPT | Mod: GP,CQ

## 2024-01-08 PROCEDURE — 97110 THERAPEUTIC EXERCISES: CPT | Mod: GP,CQ

## 2024-01-08 ASSESSMENT — PAIN SCALES - GENERAL: PAINLEVEL_OUTOF10: 2

## 2024-01-08 ASSESSMENT — PAIN - FUNCTIONAL ASSESSMENT: PAIN_FUNCTIONAL_ASSESSMENT: 0-10

## 2024-01-08 NOTE — PROGRESS NOTES
"Physical Therapy Treatment    Patient Name: Nik Cota  MRN: [id number]  Today's Date: 2024  Time Calculation  Start Time: 702  Stop Time: 744  Time Calculation (min): 42 min      Assessment:    Patient identified with name and .    Improved tolerance to PROM with better shoulder mobility and less c/o pain and tightness. Patient pleased with his gains. Will measure shoulder motion next session. Progressing toward PT goals.     Plan:    Continue with ROM as per protocol to improve functional motion in shoulder, easing difficulty with ADLs.      Treatment/Interventions: Manual therapy, Therapeutic exercises, Cryotherapy, Education/ Instruction, Vasopneumatic device  PT Plan: Skilled PT  PT Frequency:  (3x/wk for 6 weeks or 18 sessions)  Rehab Potential: Good  Plan of Care Agreement: Patient       Current Problem  Problem List Items Addressed This Visit             ICD-10-CM    Right shoulder pain M25.511    Status post rotator cuff repair Z98.890    Edema of right upper extremity R60.0       Subjective   Shoulder feels a little looser and less swollen since the last treatment. Still using ice at home.     Precautions  Precautions  Precautions Comment: No Fall risk,s/p R RTC Repair on 2023    Pain  Pain Assessment: 0-10  Pain Score: 2  Pain Location: Shoulder  Pain Orientation: Right    Treatments:  Therapeutic Exercise  Therapeutic Exercise Performed: Yes  Therapeutic Exercise: 15 minutes, 1 units   Pendulums 2x10 ea  Scap clocks 2x10   Wrist flex/ext/sup/pro x10 ea  UT stretch 3 x 20\" (seated)  Wand ER stretch (supine)    Manual Therapy  Manual Therapy Performed: Yes  Manual Therapy: 25 minutes, 2 units   PROM R shoulder: flex/ abd/ IR/ER  STW to R UT/mid scap X     OP EDUCATION:   Reviewed pendulum ex and gentle ROM for home.     Goals:  Active       PT Problem       PT Goal 1       Start:  23    Expected End:  24       LTG's:  1) Improve  R shoulder strength to >= 4+/5 throughout in order " to facilitate ability to reach overhead and lift objects as protocol allows.     4-6 weeks      2) Improve  R shoulder PROM to >= 165 deg flex/abd and 80 deg ER/IR in order to better reach overhead or behind back as protocol allows.       4-6 weeks      3) Improve R shoulder pain from   9/10 to <= 2-3/10 with activity in order to improve QOL.        4-6 weeks      4) Improve quick dash score by >= 5 points in order to improve QOL.    4-6 weeks      5) The pt will improve ability to raise arm/shoulder overhead, reach at various angles, lift/carry objects, dress upper body, overhead activity and return to exercise and work without significant limitation as protocol allows.  4-6 weeks    ST) Pt/caregiver will be I and consistent with HEP with use of handout as needed in order to maximize shoulder strength and ROM/flexibility.       2-3 weeks

## 2024-01-10 ENCOUNTER — TREATMENT (OUTPATIENT)
Dept: PHYSICAL THERAPY | Facility: CLINIC | Age: 39
End: 2024-01-10
Payer: OTHER GOVERNMENT

## 2024-01-10 DIAGNOSIS — M25.511 RIGHT SHOULDER PAIN: ICD-10-CM

## 2024-01-10 DIAGNOSIS — R60.0 EDEMA OF RIGHT UPPER EXTREMITY: ICD-10-CM

## 2024-01-10 DIAGNOSIS — Z98.890 STATUS POST ROTATOR CUFF REPAIR: ICD-10-CM

## 2024-01-10 PROCEDURE — 97110 THERAPEUTIC EXERCISES: CPT | Mod: GP,CQ

## 2024-01-10 PROCEDURE — 97140 MANUAL THERAPY 1/> REGIONS: CPT | Mod: GP,CQ

## 2024-01-10 ASSESSMENT — PAIN - FUNCTIONAL ASSESSMENT: PAIN_FUNCTIONAL_ASSESSMENT: 0-10

## 2024-01-10 ASSESSMENT — PAIN SCALES - GENERAL: PAINLEVEL_OUTOF10: 0 - NO PAIN

## 2024-01-10 NOTE — PROGRESS NOTES
"Physical Therapy Treatment    Patient Name: Nik Cota  MRN: [id number]  Today's Date: 1/10/2024  Time Calculation  Start Time: 1600  Stop Time: 1642  Time Calculation (min): 42 min  Visit: 10/17    Assessment:  Increased ROM noted with flexion passively.  Pt. Has ~120 degrees of flexion now.  Pt. States it feels like he has a knot on top the shoulder until he is stretched out some.   ER passively is about the same, ~30 degrees, end range tension noted.     Plan:  Continue with ROM as per protocol to improve functional motion in shoulder to improve ease with ADL's with little to no difficulty.  MB     Treatment/Interventions: Manual therapy, Therapeutic exercises, Cryotherapy, Education/ Instruction, Vasopneumatic device  PT Plan: Skilled PT  PT Frequency:  (3x/wk for 6 weeks or 18 sessions)  Rehab Potential: Good  Plan of Care Agreement: Patient       Current Problem  Problem List Items Addressed This Visit             ICD-10-CM    Right shoulder pain M25.511    Status post rotator cuff repair Z98.890    Edema of right upper extremity R60.0       Subjective   Pt. Has a follow up with MD next Tuesday.  Pt. Notes some swelling in the upper arm which seems to be improving.       Precautions  Precautions  Precautions Comment: No Fall risk,s/p R RTC Repair on 12/7/2023    Pain  Pain Assessment: 0-10  Pain Score: 0 - No pain  Pain Location: Shoulder  Pain Orientation: Right    Treatments: TE: 13 mins/1 unit  Pendulums 2x10 ea  Scap clocks 2x10   Wrist flex/ext/sup/pro x10 ea  UT stretch 3 x 20\" (seated)  Wand ER stretch (supine)     Manual Therapy: 25 minutes, 2 units   PROM R shoulder: flex/ abd/ IR/ER  STW to R UT/mid scap X      OP EDUCATION:       Goals:  Active       PT Problem       PT Goal 1       Start:  12/11/23    Expected End:  02/09/24       LTG's:  1) Improve  R shoulder strength to >= 4+/5 throughout in order to facilitate ability to reach overhead and lift objects as protocol allows.     4-6 weeks      2) " Improve  R shoulder PROM to >= 165 deg flex/abd and 80 deg ER/IR in order to better reach overhead or behind back as protocol allows.       4-6 weeks      3) Improve R shoulder pain from   9/10 to <= 2-3/10 with activity in order to improve QOL.        4-6 weeks      4) Improve quick dash score by >= 5 points in order to improve QOL.    4-6 weeks      5) The pt will improve ability to raise arm/shoulder overhead, reach at various angles, lift/carry objects, dress upper body, overhead activity and return to exercise and work without significant limitation as protocol allows.  4-6 weeks    ST) Pt/caregiver will be I and consistent with HEP with use of handout as needed in order to maximize shoulder strength and ROM/flexibility.       2-3 weeks

## 2024-01-12 ENCOUNTER — TREATMENT (OUTPATIENT)
Dept: PHYSICAL THERAPY | Facility: CLINIC | Age: 39
End: 2024-01-12
Payer: OTHER GOVERNMENT

## 2024-01-12 DIAGNOSIS — M25.511 RIGHT SHOULDER PAIN: ICD-10-CM

## 2024-01-12 DIAGNOSIS — Z98.890 STATUS POST ROTATOR CUFF REPAIR: ICD-10-CM

## 2024-01-12 DIAGNOSIS — M25.511 ACUTE PAIN OF RIGHT SHOULDER: Primary | ICD-10-CM

## 2024-01-12 DIAGNOSIS — R60.0 EDEMA OF RIGHT UPPER EXTREMITY: ICD-10-CM

## 2024-01-12 PROCEDURE — 97110 THERAPEUTIC EXERCISES: CPT | Mod: GP,CQ

## 2024-01-12 PROCEDURE — 97016 VASOPNEUMATIC DEVICE THERAPY: CPT | Mod: GP,CQ

## 2024-01-12 PROCEDURE — 97140 MANUAL THERAPY 1/> REGIONS: CPT | Mod: GP,CQ

## 2024-01-12 ASSESSMENT — PAIN SCALES - GENERAL: PAINLEVEL_OUTOF10: 1

## 2024-01-12 ASSESSMENT — PAIN - FUNCTIONAL ASSESSMENT: PAIN_FUNCTIONAL_ASSESSMENT: 0-10

## 2024-01-12 NOTE — PROGRESS NOTES
"Physical Therapy Treatment    Patient Name: Nik Cota  MRN: [id number]  Today's Date: 1/12/2024  Time Calculation  Start Time: 0701  Stop Time: 0800  Time Calculation (min): 59 min      Assessment:   Patient appropriately challenged. Able to incorporate isometrics per protocol. Demo's soreness at end of session. Able to achieve approx 120 degrees of PROM R GH flexion. Patient responds well to treatment.     Plan:  Continue to work on improving strength and ROM to be able to get a full nights sleep uninterrupted. -AB.     OP PT Plan  Treatment/Interventions: Manual therapy, Therapeutic exercises, Cryotherapy, Education/ Instruction, Vasopneumatic device  PT Plan: Skilled PT  PT Frequency:  (3x/wk for 6 weeks or 18 sessions)  Rehab Potential: Good  Plan of Care Agreement: Patient    Current Problem  Problem List Items Addressed This Visit             ICD-10-CM    Right shoulder pain - Primary M25.511    Status post rotator cuff repair Z98.890    Edema of right upper extremity R60.0       Subjective   General   Patient states that he isn't really doing exercises at home. Patient states that he knows that he needs to ice more at home because he feels like his shoulder is still swollen.     Precautions  Precautions  Precautions Comment: No Fall risk,s/p R RTC Repair on 12/7/2023    Pain  Pain Assessment: 0-10  Pain Score: 1  Pain Location: Shoulder  Pain Orientation: Right      Treatments:  Therapeutic Exercise: 25 minutes, 2 units  UBE x3' Fwd/x3' Bkwd for ROM (N)  Pulleys x3' Flexion (N)  Pendulums 2x10 ea  Shoulder circles x10 (N)  Standing isometrics Flex/Ext/Abduction x10 5\" hold (N)  Scap clocks 2x10 (X, not today)   Wrist flex/ext/sup/pro x10 ea (X, not today)  UT stretch 3 x 20\" (seated)  Wand ER stretch (supine)     Manual Therapy: 15 minutes, 1 units   PROM R shoulder: flex/ abd/ IR/ER  STW to R UT/mid scap X    Modalities: 15 minutes, 1 units  Gameready x15' 34 degrees Medium Compression    OP EDUCATION:   " Reviewed he verbalized good understanding.   Access Code: [email or web address]  URL: [email or web address]  Date: 2023  Prepared by:      Exercises  - Circular Shoulder Pendulum with Table Support  - 1 x daily - 7 x weekly - 3 sets - 10 reps  - Forearm Strengthening with Ball Squeeze  - 1 x daily - 7 x weekly - 3 sets - 10 reps  - Flexion-Extension Shoulder Pendulum with Table Support  - 1 x daily - 7 x weekly - 3 sets - 10 reps  - Seated Scapular Clock (1 to 7)  - 1 x daily - 7 x weekly - 3 sets - 10 reps    Goals:  Active       PT Problem       PT Goal 1       Start:  23    Expected End:  24       LTG's:  1) Improve  R shoulder strength to >= 4+/5 throughout in order to facilitate ability to reach overhead and lift objects as protocol allows.     4-6 weeks      2) Improve  R shoulder PROM to >= 165 deg flex/abd and 80 deg ER/IR in order to better reach overhead or behind back as protocol allows.       4-6 weeks      3) Improve R shoulder pain from   9/10 to <= 2-3/10 with activity in order to improve QOL.        4-6 weeks      4) Improve quick dash score by >= 5 points in order to improve QOL.    4-6 weeks      5) The pt will improve ability to raise arm/shoulder overhead, reach at various angles, lift/carry objects, dress upper body, overhead activity and return to exercise and work without significant limitation as protocol allows.  4-6 weeks    ST) Pt/caregiver will be I and consistent with HEP with use of handout as needed in order to maximize shoulder strength and ROM/flexibility.       2-3 weeks

## 2024-01-15 ENCOUNTER — TREATMENT (OUTPATIENT)
Dept: PHYSICAL THERAPY | Facility: CLINIC | Age: 39
End: 2024-01-15
Payer: OTHER GOVERNMENT

## 2024-01-15 DIAGNOSIS — Z98.890 STATUS POST ROTATOR CUFF REPAIR: ICD-10-CM

## 2024-01-15 DIAGNOSIS — M25.511 RIGHT SHOULDER PAIN: ICD-10-CM

## 2024-01-15 DIAGNOSIS — R60.0 EDEMA OF RIGHT UPPER EXTREMITY: ICD-10-CM

## 2024-01-15 PROCEDURE — 97110 THERAPEUTIC EXERCISES: CPT | Mod: GP,CQ

## 2024-01-15 PROCEDURE — 97140 MANUAL THERAPY 1/> REGIONS: CPT | Mod: GP,CQ

## 2024-01-15 ASSESSMENT — PAIN SCALES - GENERAL: PAINLEVEL_OUTOF10: 1

## 2024-01-15 ASSESSMENT — PAIN - FUNCTIONAL ASSESSMENT: PAIN_FUNCTIONAL_ASSESSMENT: 0-10

## 2024-01-15 NOTE — PROGRESS NOTES
"Physical Therapy Treatment    Patient Name: Nik Cota  MRN: [id number]  Today's Date: 1/15/2024  Time Calculation  Start Time: 705  Stop Time: 746  Time Calculation (min): 41 min      Assessment:   Patient identified with name and .   Patient 5 mins late for treatment today because of sick child. Progressing as per physician protocol to improve motion for ADLs. Good response and tolerance to session and no increase in symptoms post-treatment. Patient has follow-up with physician tomorrow.    Plan:  Will continue advancing as per protocol.   OP PT Plan  Treatment/Interventions: Manual therapy, Therapeutic exercises, Cryotherapy, Education/ Instruction, Vasopneumatic device  PT Plan: Skilled PT  PT Frequency:  (3x/wk for 6 weeks or 18 sessions)  Rehab Potential: Good  Plan of Care Agreement: Patient    Current Problem  Problem List Items Addressed This Visit             ICD-10-CM    Right shoulder pain M25.511    Status post rotator cuff repair Z98.890    Edema of right upper extremity R60.0       Subjective   Not much change in the shoulder. Still wearing the sling every day as Dr ordered.     Precautions  Precautions  Precautions Comment: No Fall risk,s/p R RTC Repair on 2023    Pain  Pain Assessment: 0-10  Pain Score: 1  Pain Location: Shoulder  Pain Orientation: Right    Treatments:  Therapeutic Exercise  Therapeutic Exercise Performed: Yes  Therapeutic Exercise: 28 minutes, 2 units   UBE x3' Fwd/x3' Bkwd for ROM  Pulleys x3' Flexion  Pendulums 2x10 ea  Shoulder circles x10   Standing isometrics Flex/Ext/Abduction x10 5\" hold  Scap clocks 2x10 (X, not today)   Wand ER stretch (supine)    Not Today:  Wrist flex/ext/sup/pro x10 ea (X, not today)  UT stretch 3 x 20\" (seated)    Manual Therapy  Manual Therapy Performed: Yes  Manual Therapy: 12 minutes, 1 units   PROM R shoulder: flex/ abd/ IR/ER  STW to R UT/mid scap X    OP EDUCATION:   Educated in use of over-door pulleys (flex and scapt) for home and " provided pulleys.    Goals:  Active       PT Problem       PT Goal 1       Start:  23    Expected End:  24       LTG's:  1) Improve  R shoulder strength to >= 4+/5 throughout in order to facilitate ability to reach overhead and lift objects as protocol allows.     4-6 weeks      2) Improve  R shoulder PROM to >= 165 deg flex/abd and 80 deg ER/IR in order to better reach overhead or behind back as protocol allows.       4-6 weeks      3) Improve R shoulder pain from   9/10 to <= 2-3/10 with activity in order to improve QOL.        4-6 weeks      4) Improve quick dash score by >= 5 points in order to improve QOL.    4-6 weeks      5) The pt will improve ability to raise arm/shoulder overhead, reach at various angles, lift/carry objects, dress upper body, overhead activity and return to exercise and work without significant limitation as protocol allows.  4-6 weeks    ST) Pt/caregiver will be I and consistent with HEP with use of handout as needed in order to maximize shoulder strength and ROM/flexibility.       2-3 weeks

## 2024-01-17 ENCOUNTER — TREATMENT (OUTPATIENT)
Dept: PHYSICAL THERAPY | Facility: CLINIC | Age: 39
End: 2024-01-17
Payer: OTHER GOVERNMENT

## 2024-01-17 DIAGNOSIS — Z98.890 STATUS POST ROTATOR CUFF REPAIR: ICD-10-CM

## 2024-01-17 DIAGNOSIS — R60.0 EDEMA OF RIGHT UPPER EXTREMITY: ICD-10-CM

## 2024-01-17 DIAGNOSIS — M25.511 RIGHT SHOULDER PAIN: ICD-10-CM

## 2024-01-17 PROCEDURE — 97110 THERAPEUTIC EXERCISES: CPT | Mod: GP,CQ

## 2024-01-17 PROCEDURE — 97140 MANUAL THERAPY 1/> REGIONS: CPT | Mod: GP,CQ

## 2024-01-17 ASSESSMENT — PAIN - FUNCTIONAL ASSESSMENT: PAIN_FUNCTIONAL_ASSESSMENT: 0-10

## 2024-01-17 ASSESSMENT — PAIN SCALES - GENERAL: PAINLEVEL_OUTOF10: 1

## 2024-01-17 NOTE — PROGRESS NOTES
"Physical Therapy Treatment    Patient Name: Nik Cota  MRN: [id number]  Today's Date: 2024  Time Calculation  Start Time: 0700  Stop Time: 0745  Time Calculation (min): 45 min      Assessment:   Patient identified with name and .   Shoulder ROM still limited with pain and tightness but good response to manual treatment. Advancing activities as per physician protocol.   Patient provided bag of ice to use after treatment today as he's driving over an hour for work.     Plan:  Will continue as per poc to improve functional motion in RUE, reducing ADL and IADL challenges.   OP PT Plan  Treatment/Interventions: Manual therapy, Therapeutic exercises, Cryotherapy, Education/ Instruction, Vasopneumatic device  PT Plan: Skilled PT  PT Frequency:  (3x/wk for 6 weeks or 18 sessions)  Rehab Potential: Good  Plan of Care Agreement: Patient    Current Problem  Problem List Items Addressed This Visit             ICD-10-CM    Right shoulder pain M25.511    Status post rotator cuff repair Z98.890    Edema of right upper extremity R60.0       Subjective   Saw my Dr yesterday and got a good report.   Not much change in the shoulder today - very little soreness.     Precautions  Precautions  Precautions Comment: No Fall risk,s/p R RTC Repair on 2023    Pain  Pain Assessment: 0-10  Pain Score: 1  Pain Location: Shoulder  Pain Orientation: Right    Treatments:  Therapeutic Exercise  Therapeutic Exercise Performed: Yes  Therapeutic Exercise: 30 minutes, 2 units   UBE x3' Fwd/x3' Bkwd for ROM  Pulleys x3' Flexion  Pendulums 2x10 ea  Seated press-ups: 2x8  [N]  Prone: ext, rows, horiz abd: x20 ea  [N]  Shoulder circles x10 [X]  Standing isometrics Flex/Ext/Abduction x10 5\" hold [X - no time today]  Scap clocks 2x10 (X, not today)   Wand ER stretch (supine)     Not Today:  Wrist flex/ext/sup/pro x10 ea (X, not today)  UT stretch 3 x 20\" (seated)     Manual Therapy  Manual Therapy Performed: Yes  Manual Therapy: 12 minutes, 1 " units   PROM R shoulder: flex/ abd/ IR/ER  STW to R UT/mid scap X     OP EDUCATION:   Reviewed use of ice and discussed positioning to help with edema, if needed.    Goals:  Active       PT Problem       PT Goal 1       Start:  23    Expected End:  24       LTG's:  1) Improve  R shoulder strength to >= 4+/5 throughout in order to facilitate ability to reach overhead and lift objects as protocol allows.     4-6 weeks      2) Improve  R shoulder PROM to >= 165 deg flex/abd and 80 deg ER/IR in order to better reach overhead or behind back as protocol allows.       4-6 weeks      3) Improve R shoulder pain from   9/10 to <= 2-3/10 with activity in order to improve QOL.        4-6 weeks      4) Improve quick dash score by >= 5 points in order to improve QOL.    4-6 weeks      5) The pt will improve ability to raise arm/shoulder overhead, reach at various angles, lift/carry objects, dress upper body, overhead activity and return to exercise and work without significant limitation as protocol allows.  4-6 weeks    ST) Pt/caregiver will be I and consistent with HEP with use of handout as needed in order to maximize shoulder strength and ROM/flexibility.       2-3 weeks

## 2024-01-19 ENCOUNTER — TREATMENT (OUTPATIENT)
Dept: PHYSICAL THERAPY | Facility: CLINIC | Age: 39
End: 2024-01-19
Payer: OTHER GOVERNMENT

## 2024-01-19 DIAGNOSIS — M25.511 RIGHT SHOULDER PAIN: ICD-10-CM

## 2024-01-19 DIAGNOSIS — Z98.890 STATUS POST ROTATOR CUFF REPAIR: ICD-10-CM

## 2024-01-19 DIAGNOSIS — R60.0 EDEMA OF RIGHT UPPER EXTREMITY: ICD-10-CM

## 2024-01-19 PROCEDURE — 97140 MANUAL THERAPY 1/> REGIONS: CPT | Mod: GP,CQ

## 2024-01-19 PROCEDURE — 97110 THERAPEUTIC EXERCISES: CPT | Mod: GP,CQ

## 2024-01-19 ASSESSMENT — PAIN SCALES - GENERAL: PAINLEVEL_OUTOF10: 1

## 2024-01-19 ASSESSMENT — PAIN - FUNCTIONAL ASSESSMENT: PAIN_FUNCTIONAL_ASSESSMENT: 0-10

## 2024-01-19 NOTE — PROGRESS NOTES
"Physical Therapy Treatment    Patient Name: Nik Cota  MRN: [id number]  Today's Date: 1/19/2024  Time Calculation  Start Time: 0702  Stop Time: 0744  Time Calculation (min): 42 min      Assessment:   Patient appropriately challenged. Demos restriction in R GH musculature, but responds well to STM. Demo's limitations in R GH PROM Flexion. Tolerates PRE's with no exacerbation of symptoms.     Plan:  Continue to work on improving strength and ROM to be able to raise RUE overhead with little to no increased symptoms. -AB.     OP PT Plan  Treatment/Interventions: Manual therapy, Therapeutic exercises, Cryotherapy, Education/ Instruction, Vasopneumatic device  PT Plan: Skilled PT  PT Frequency:  (3x/wk for 6 weeks or 18 sessions)  Rehab Potential: Good  Plan of Care Agreement: Patient    Current Problem  Problem List Items Addressed This Visit             ICD-10-CM    Right shoulder pain M25.511    Status post rotator cuff repair Z98.890    Edema of right upper extremity R60.0       Subjective   General   Patient states that his shoulder doesn't really hurt. States that the dr is happy with how he is healing.     Precautions  Precautions  Precautions Comment: No Fall risk,s/p R RTC Repair on 12/7/2023    Pain  Pain Assessment: 0-10  Pain Score: 1  Pain Location: Shoulder  Pain Orientation: Right      Treatments:  Therapeutic Exercise: 25 minutes, 2 units  UBE x3' Fwd/x3' Bkwd for ROM  Pulleys x3' Flexion/Scaption   Pendulums 2x10 ea  Seated press-ups: 2x8    Prone: ext, rows, horiz abd, flex: x20 ea    Shoulder circles x10 [X]  Standing isometrics Flex/Ext/Abduction x10 5\" hold [X - no time today]  Scap clocks 2x10 (X, not today)   Wand ER stretch (supine)     Not Today:  Wrist flex/ext/sup/pro x10 ea (X, not today)  UT stretch 3 x 20\" (seated)       Manual Therapy: 15 minutes, 1 units   PROM R shoulder: flex/ abd/ IR/ER  STW to R UT/mid scap     OP EDUCATION:   Reviewed he verbalized good understanding.   Access Code: " [email or web address]  URL: [email or web address]  Date: 2023  Prepared by:      Exercises  - Circular Shoulder Pendulum with Table Support  - 1 x daily - 7 x weekly - 3 sets - 10 reps  - Forearm Strengthening with Ball Squeeze  - 1 x daily - 7 x weekly - 3 sets - 10 reps  - Flexion-Extension Shoulder Pendulum with Table Support  - 1 x daily - 7 x weekly - 3 sets - 10 reps  - Seated Scapular Clock (1 to 7)  - 1 x daily - 7 x weekly - 3 sets - 10 reps    Goals:  Active       PT Problem       PT Goal 1       Start:  23    Expected End:  24       LTG's:  1) Improve  R shoulder strength to >= 4+/5 throughout in order to facilitate ability to reach overhead and lift objects as protocol allows.     4-6 weeks      2) Improve  R shoulder PROM to >= 165 deg flex/abd and 80 deg ER/IR in order to better reach overhead or behind back as protocol allows.       4-6 weeks      3) Improve R shoulder pain from   9/10 to <= 2-3/10 with activity in order to improve QOL.        4-6 weeks      4) Improve quick dash score by >= 5 points in order to improve QOL.    4-6 weeks      5) The pt will improve ability to raise arm/shoulder overhead, reach at various angles, lift/carry objects, dress upper body, overhead activity and return to exercise and work without significant limitation as protocol allows.  4-6 weeks    ST) Pt/caregiver will be I and consistent with HEP with use of handout as needed in order to maximize shoulder strength and ROM/flexibility.       2-3 weeks

## 2024-01-22 ENCOUNTER — TREATMENT (OUTPATIENT)
Dept: PHYSICAL THERAPY | Facility: CLINIC | Age: 39
End: 2024-01-22
Payer: OTHER GOVERNMENT

## 2024-01-22 DIAGNOSIS — R60.0 EDEMA OF RIGHT UPPER EXTREMITY: ICD-10-CM

## 2024-01-22 DIAGNOSIS — Z98.890 STATUS POST ROTATOR CUFF REPAIR: ICD-10-CM

## 2024-01-22 DIAGNOSIS — M25.511 RIGHT SHOULDER PAIN: ICD-10-CM

## 2024-01-22 PROCEDURE — 97140 MANUAL THERAPY 1/> REGIONS: CPT | Mod: GP,CQ

## 2024-01-22 PROCEDURE — 97110 THERAPEUTIC EXERCISES: CPT | Mod: GP,CQ

## 2024-01-22 ASSESSMENT — PAIN SCALES - GENERAL: PAINLEVEL_OUTOF10: 1

## 2024-01-22 ASSESSMENT — PAIN - FUNCTIONAL ASSESSMENT: PAIN_FUNCTIONAL_ASSESSMENT: 0-10

## 2024-01-22 NOTE — PROGRESS NOTES
"Physical Therapy Treatment    Patient Name: Nik Cota  MRN: [id number]  Today's Date: 2024  Time Calculation  Start Time: 704  Stop Time: 748  Time Calculation (min): 44 min      Assessment:   Patient identified with name and .   Patient tolerates therex well. Pain limiting ROM, however ROM is improving. Advancing ex's as per physician protocol.    Plan:  Will continue poc to improve mobility in right shoulder for ADLs and IADLs.     OP PT Plan  Treatment/Interventions: Manual therapy, Therapeutic exercises, Cryotherapy, Education/ Instruction, Vasopneumatic device  PT Plan: Skilled PT  PT Frequency:  (3x/wk for 6 weeks or 18 sessions)  Rehab Potential: Good  Plan of Care Agreement: Patient    Current Problem  Problem List Items Addressed This Visit             ICD-10-CM    Right shoulder pain M25.511    Status post rotator cuff repair Z98.890    Edema of right upper extremity R60.0       Subjective   Shoulder is a little tighter today after running  2 days ago. It feels better after being stretching and doing some ex.    Precautions  Precautions  Precautions Comment: No Fall risk,s/p R RTC Repair on 2023    Pain  Pain Assessment: 0-10  Pain Score: 1  Pain Location: Shoulder  Pain Orientation: Right    Treatments:  Therapeutic Exercise  Therapeutic Exercise Performed: Yes  Therapeutic Exercise: 30 minutes, 2 units   UBE x3' Fwd/x3' Bkwd for ROM  Pulleys x3' Flexion/Scaption   Pendulums 2x10 ea  Seated press-ups: 2x8    Prone: ext, rows, horiz abd, flex: x20 ea    Shoulder circles x10 [X]  Standing isometrics Flex/Ext/Abduction x10 5\" hold [X - no time today]  Scap clocks 2x10 (X, not today)   Wand ER stretch (supine)     Not Today:  Wrist flex/ext/sup/pro x10 ea (X, not today)  UT stretch 3 x 20\" (seated)    Manual Therapy  Manual Therapy Performed: Yes  Manual Therapy: 12 minutes, 1 units   PROM R shoulder: flex/ abd/ IR/ER  STW to R UT/mid scap [X]    OP EDUCATION:   HEP reviewed. "     Goals:  Active       PT Problem       PT Goal 1       Start:  23    Expected End:  24       LTG's:  1) Improve  R shoulder strength to >= 4+/5 throughout in order to facilitate ability to reach overhead and lift objects as protocol allows.     4-6 weeks      2) Improve  R shoulder PROM to >= 165 deg flex/abd and 80 deg ER/IR in order to better reach overhead or behind back as protocol allows.       4-6 weeks      3) Improve R shoulder pain from   9/10 to <= 2-3/10 with activity in order to improve QOL.        4-6 weeks      4) Improve quick dash score by >= 5 points in order to improve QOL.    4-6 weeks      5) The pt will improve ability to raise arm/shoulder overhead, reach at various angles, lift/carry objects, dress upper body, overhead activity and return to exercise and work without significant limitation as protocol allows.  4-6 weeks    ST) Pt/caregiver will be I and consistent with HEP with use of handout as needed in order to maximize shoulder strength and ROM/flexibility.       2-3 weeks

## 2024-01-24 ENCOUNTER — TREATMENT (OUTPATIENT)
Dept: PHYSICAL THERAPY | Facility: CLINIC | Age: 39
End: 2024-01-24
Payer: OTHER GOVERNMENT

## 2024-01-24 DIAGNOSIS — R60.0 EDEMA OF RIGHT UPPER EXTREMITY: ICD-10-CM

## 2024-01-24 DIAGNOSIS — Z98.890 STATUS POST ROTATOR CUFF REPAIR: ICD-10-CM

## 2024-01-24 DIAGNOSIS — M25.511 RIGHT SHOULDER PAIN: ICD-10-CM

## 2024-01-24 PROCEDURE — 97140 MANUAL THERAPY 1/> REGIONS: CPT | Mod: GP,CQ

## 2024-01-24 PROCEDURE — 97110 THERAPEUTIC EXERCISES: CPT | Mod: GP,CQ

## 2024-01-24 ASSESSMENT — PAIN SCALES - GENERAL: PAINLEVEL_OUTOF10: 3

## 2024-01-24 ASSESSMENT — PAIN - FUNCTIONAL ASSESSMENT: PAIN_FUNCTIONAL_ASSESSMENT: 0-10

## 2024-01-24 NOTE — PROGRESS NOTES
"Physical Therapy Treatment    Patient Name: Nik Cota  MRN: [id number]  Today's Date: 2024  Time Calculation  Start Time: 701  Stop Time: 745  Time Calculation (min): 44 min      Assessment:   Patient identified with name and .   Patient indicated reduced pain and tightness following today's session. Did note greater tightness, as compared to previous 2 treatments, during PROM. No additions/changes to program today since patient experiencing an increase in shoulder soreness.    Plan:  Will continue poc to improve mobility in right shoulder for ADLs and IADLs.    OP PT Plan  Treatment/Interventions: Manual therapy, Therapeutic exercises, Cryotherapy, Education/ Instruction, Vasopneumatic device  PT Plan: Skilled PT  PT Frequency:  (3x/wk for 6 weeks or 18 sessions)  Rehab Potential: Good  Plan of Care Agreement: Patient    Current Problem  Problem List Items Addressed This Visit             ICD-10-CM    Right shoulder pain M25.511    Status post rotator cuff repair Z98.890    Edema of right upper extremity R60.0       Subjective   Right shoulder is a little more sore this morning for some reason and it was real achy yesterday. Had to change a tire yesterday but tried real hard to not use my right arm.    Precautions  Precautions  Precautions Comment: No Fall risk,s/p R RTC Repair on 2023    Pain  Pain Assessment: 0-10  Pain Score: 3  Pain Location: Shoulder  Pain Orientation: Right    Treatments:  Therapeutic Exercise  Therapeutic Exercise Performed: Yes  Therapeutic Exercise: 30 minutes, 2 units   UBE x3' Fwd/x3' Bkwd for ROM  Pulleys x3' Flexion/Scaption   Pendulums 2x10 ea  Seated press-ups: 2x8    Prone: ext, rows, horiz abd, flex: x20 ea    Shoulder circles x10 [X]  Standing isometrics Flex/Ext/Abduction x10 5\" hold [X - no time today]  Scap clocks 2x10 (X, not today)   Wand ER stretch (supine)     Not Today:  Wrist flex/ext/sup/pro x10 ea (X, not today)  UT stretch 3 x 20\" (seated)     Manual " Therapy  Manual Therapy Performed: Yes  Manual Therapy: 12 minutes, 1 units   PROM R shoulder: flex/ abd/ IR/ER  STW to R UT/mid scap [X]    OP EDUCATION:   Patient notes compliance with HEP.    Goals:  Active       PT Problem       PT Goal 1       Start:  23    Expected End:  24       LTG's:  1) Improve  R shoulder strength to >= 4+/5 throughout in order to facilitate ability to reach overhead and lift objects as protocol allows.     4-6 weeks      2) Improve  R shoulder PROM to >= 165 deg flex/abd and 80 deg ER/IR in order to better reach overhead or behind back as protocol allows.       4-6 weeks      3) Improve R shoulder pain from   9/10 to <= 2-3/10 with activity in order to improve QOL.        4-6 weeks      4) Improve quick dash score by >= 5 points in order to improve QOL.    4-6 weeks      5) The pt will improve ability to raise arm/shoulder overhead, reach at various angles, lift/carry objects, dress upper body, overhead activity and return to exercise and work without significant limitation as protocol allows.  4-6 weeks    ST) Pt/caregiver will be I and consistent with HEP with use of handout as needed in order to maximize shoulder strength and ROM/flexibility.       2-3 weeks

## 2024-01-25 ENCOUNTER — TREATMENT (OUTPATIENT)
Dept: PHYSICAL THERAPY | Facility: CLINIC | Age: 39
End: 2024-01-25
Payer: OTHER GOVERNMENT

## 2024-01-25 DIAGNOSIS — Z98.890 STATUS POST ROTATOR CUFF REPAIR: ICD-10-CM

## 2024-01-25 DIAGNOSIS — M25.511 RIGHT SHOULDER PAIN: ICD-10-CM

## 2024-01-25 DIAGNOSIS — R60.0 EDEMA OF RIGHT UPPER EXTREMITY: ICD-10-CM

## 2024-01-25 PROCEDURE — 97110 THERAPEUTIC EXERCISES: CPT | Mod: GP | Performed by: PHYSICAL THERAPIST

## 2024-01-25 ASSESSMENT — PAIN - FUNCTIONAL ASSESSMENT: PAIN_FUNCTIONAL_ASSESSMENT: 0-10

## 2024-01-25 ASSESSMENT — PAIN SCALES - GENERAL: PAINLEVEL_OUTOF10: 1

## 2024-01-25 NOTE — PROGRESS NOTES
Physical Therapy Reassessment/Treatment    Patient Name: Nik Cota  MRN: [id number]  Today's Date: 1/25/2024  Time Calculation  Start Time: 0730  Stop Time: 0800  Time Calculation (min): 30 min    General  General Comment: Visit #18  or 0/12    Assessment:   The pt is making good progress in PT with improved R shoulder strength and ROM/flexibility  (R shoulder strength 4-/5 throughout and R shoulder PROM 155 deg flex/abd in slight scaption, 70 deg ER and 65 deg IR) within protocol guidelines.  Pt is making progress towards meeting his PT goals and is I with his current HEP.  Pt still has significant R shoulder weakness and mild ROM limitations and would benefit from continuing PT as per current POC within protocol guidelines.  Continue PT 2x/wk for 6 more weeks or 12 more sessions.      Plan:   OP PT Plan  Treatment/Interventions: Manual therapy, Therapeutic exercises, Cryotherapy, Education/ Instruction, Vasopneumatic device  PT Plan: Skilled PT  PT Frequency:  (2x/wk for 6 weeks or 12 sessions)  Rehab Potential: Good  Plan of Care Agreement: Patient    1/25/2024 PT Reassessment:    The pt is making good progress in PT with improved R shoulder strength and ROM/flexibility  (R shoulder strength 4-/5 throughout and R shoulder PROM 155 deg flex/abd in slight scaption, 70 deg ER and 65 deg IR) within protocol guidelines.  Pt is making progress towards meeting his PT goals and is I with his current HEP.  Pt still has significant R shoulder weakness and mild ROM limitations and would benefit from continuing PT as per current POC within protocol guidelines.  Continue PT 2x/wk for 6 more weeks or 12 more sessions.      Current Problem  Problem List Items Addressed This Visit             ICD-10-CM       Musculoskeletal and Injuries    Right shoulder pain M25.511    Status post rotator cuff repair Z98.890       Symptoms and Signs    Edema of right upper extremity R60.0       Subjective  Pt reports that his R shoulder still  "feels stiff and tight at times but is improving overall since surgery.  Pt reports that he felt a pop in the shoulder yesterday that made it feel a lot better and move better.      Precautions  Precautions  Precautions Comment: No Fall risk,s/p R RTC Repair on 12/7/2023    Pain  Pain Assessment: 0-10  Pain Score: 1  Pain Type: Surgical pain  Pain Location: Shoulder  Pain Orientation: Right      Treatments:  Therapeutic Exercise: 30 minutes, 2 units   UBE x3' Fwd/x3' Bkwd for ROM  Pulleys x3' Flexion/Scaption   Resistive Rowing  Pink Tubing  2 x 10 reps  N  Resistive Shoulder Extension  Pink Tubing  2 x 10 reps  N  Seated press-ups: 2x8  X  Prone: ext, rows, horiz abd, flex: x20 ea  X  Shoulder circles x10 [X]  Standing isometrics Flex/Ext/Abduction x10 5\" hold [X - no time today]  Scap clocks 2x10 (X, not today)   Wand ER stretch (supine)X  PROM R Shoulder all planes  10 min    1/25/2024 PT Reassessment Completed.    Manual Therapy:   X today  PROM R shoulder: flex/ abd/ IR/ER  STW to R UT/mid scap [X]    OP EDUCATION:   Edu on proper form and speed of movement with ex's.  Pt verbalized/demonstrated good understanding.      Goals:  Active       PT Problem       PT Goal 1       Start:  12/11/23    Expected End:  02/09/24       LTG's:  1) Improve  R shoulder strength to >= 4+/5 throughout in order to facilitate ability to reach overhead and lift objects as protocol allows.     4-6 weeks  1/25/2024, PARTIALLY MET, R shoulder strength 4-/5 throughout at reassessment    2) Improve  R shoulder PROM to >= 165 deg flex/abd and 80 deg ER/IR in order to better reach overhead or behind back as protocol allows.       4-6 weeks  1/25/2024, IN PROGRESS, 155 deg flex and abd, 70 deg ER and 65 deg IR.    3) Improve R shoulder pain from   9/10 to <= 2-3/10 with activity in order to improve QOL.        4-6 weeks  1/25/2024, MET, R shoulder pain 0-3/10 with movement/activity    4) Improve quick dash score by >= 5 points in order to " improve QOL.    4-6 weeks  2024, MET, pt scored a 90.91 at baseline and a 47.73 at DC.      5) The pt will improve ability to raise arm/shoulder overhead, reach at various angles, lift/carry objects, dress upper body, overhead activity and return to exercise and work without significant limitation as protocol allows.  4-6 weeks  2024, IN PROGRESS, pt is making improvements with the above activity but is not able to exercise or lift objects without limitation or work at full capacity at this time due to his surgical protocol    ST) Pt/caregiver will be I and consistent with HEP with use of handout as needed in order to maximize shoulder strength and ROM/flexibility.       2-3 weeks  2024, MET, pt is I with current HEP and has handouts.

## 2024-01-29 ENCOUNTER — TREATMENT (OUTPATIENT)
Dept: PHYSICAL THERAPY | Facility: CLINIC | Age: 39
End: 2024-01-29
Payer: OTHER GOVERNMENT

## 2024-01-29 DIAGNOSIS — M25.511 RIGHT SHOULDER PAIN: ICD-10-CM

## 2024-01-29 DIAGNOSIS — Z98.890 STATUS POST ROTATOR CUFF REPAIR: ICD-10-CM

## 2024-01-29 DIAGNOSIS — R60.0 EDEMA OF RIGHT UPPER EXTREMITY: ICD-10-CM

## 2024-01-29 PROCEDURE — 97110 THERAPEUTIC EXERCISES: CPT | Mod: GP,CQ

## 2024-01-29 PROCEDURE — 97140 MANUAL THERAPY 1/> REGIONS: CPT | Mod: GP,CQ

## 2024-01-29 ASSESSMENT — PAIN SCALES - GENERAL: PAINLEVEL_OUTOF10: 1

## 2024-01-29 ASSESSMENT — PAIN - FUNCTIONAL ASSESSMENT: PAIN_FUNCTIONAL_ASSESSMENT: 0-10

## 2024-01-29 NOTE — PROGRESS NOTES
"Physical Therapy Treatment    Patient Name: Nik Cota  MRN: [id number]  Today's Date: 2024  Time Calculation  Start Time: 703  Stop Time: 745  Time Calculation (min): 42 min      Assessment:   Patient identified with name and .   Quick fatigue with therex but patient without increase in pain/discomfort. Tightness in shoulder still limits ROM -> continuing manual stretches along with therex.    Plan:  Will continue advancing as per protocol to improve functional mobility and strength in RUE.   OP PT Plan  Treatment/Interventions: Manual therapy, Therapeutic exercises, Cryotherapy, Education/ Instruction, Vasopneumatic device  PT Plan: Skilled PT  PT Frequency:  (3x/wk for 6 weeks or 18 sessions)  Rehab Potential: Good  Plan of Care Agreement: Patient    Current Problem  Problem List Items Addressed This Visit             ICD-10-CM    Right shoulder pain M25.511    Status post rotator cuff repair Z98.890    Edema of right upper extremity R60.0       Subjective   Right arm was pretty tired after the last treatment but didn't hurt any more than normal. Right arm feels weak if trying to use it much.     Precautions  Precautions  Precautions Comment: No Fall risk,s/p R RTC Repair on 2023    Pain  Pain Assessment: 0-10  Pain Score: 1  Pain Type: Surgical pain  Pain Location: Shoulder  Pain Orientation: Right    Treatments:  Therapeutic Exercise  Therapeutic Exercise Performed: Yes  Therapeutic Exercise: 28 minutes, 2 units   UBE x3' Fwd/x3' Bkwd for ROM  Pulleys x3' Flexion/Scaption   Resistive Rowing  Pink Tubing 3x10 reps    Resistive Shoulder Extension  Pink Tubing  3x10 reps    Seated press-ups: 2x10    Prone: ext, rows, horiz abd, flex: 3x10 ea    S/L ER: 0#, 2x10  Ball rolls on wall: 1 kg ball, cw, ccw, side/side, up/down, x15-20 ea  Shoulder circles x10 [X]  Standing isometrics Flex/Ext/Abduction x10 5\" hold [X - no time today]  Scap clocks 2x10 (X, not today)   Wand ER stretch (supine)X     Manual " Therapy  Manual Therapy Performed: Yes  Manual Therapy: 12 minutes, 1 units   PROM R shoulder: flex/ abd/ IR/ER  STW to R UT/mid scap [X]    OP EDUCATION:   Edu on proper form and speed of movement with ex's.  Pt verbalized/demonstrated good understanding.       Goals:  Active       PT Problem       PT Goal 1       Start:  23    Expected End:  24       LTG's:  1) Improve  R shoulder strength to >= 4+/5 throughout in order to facilitate ability to reach overhead and lift objects as protocol allows.     4-6 weeks      2) Improve  R shoulder PROM to >= 165 deg flex/abd and 80 deg ER/IR in order to better reach overhead or behind back as protocol allows.       4-6 weeks      3) Improve R shoulder pain from   9/10 to <= 2-3/10 with activity in order to improve QOL.        4-6 weeks      4) Improve quick dash score by >= 5 points in order to improve QOL.    4-6 weeks      5) The pt will improve ability to raise arm/shoulder overhead, reach at various angles, lift/carry objects, dress upper body, overhead activity and return to exercise and work without significant limitation as protocol allows.  4-6 weeks    ST) Pt/caregiver will be I and consistent with HEP with use of handout as needed in order to maximize shoulder strength and ROM/flexibility.       2-3 weeks

## 2024-01-31 ENCOUNTER — TREATMENT (OUTPATIENT)
Dept: PHYSICAL THERAPY | Facility: CLINIC | Age: 39
End: 2024-01-31
Payer: OTHER GOVERNMENT

## 2024-01-31 DIAGNOSIS — M25.511 RIGHT SHOULDER PAIN: ICD-10-CM

## 2024-01-31 DIAGNOSIS — Z98.890 STATUS POST ROTATOR CUFF REPAIR: ICD-10-CM

## 2024-01-31 DIAGNOSIS — R60.0 EDEMA OF RIGHT UPPER EXTREMITY: ICD-10-CM

## 2024-01-31 DIAGNOSIS — M25.511 ACUTE PAIN OF RIGHT SHOULDER: Primary | ICD-10-CM

## 2024-01-31 PROCEDURE — 97140 MANUAL THERAPY 1/> REGIONS: CPT | Mod: GP

## 2024-01-31 PROCEDURE — 97110 THERAPEUTIC EXERCISES: CPT | Mod: GP

## 2024-01-31 ASSESSMENT — PAIN SCALES - GENERAL: PAINLEVEL_OUTOF10: 0 - NO PAIN

## 2024-01-31 ASSESSMENT — PAIN - FUNCTIONAL ASSESSMENT: PAIN_FUNCTIONAL_ASSESSMENT: 0-10

## 2024-01-31 NOTE — PROGRESS NOTES
"Physical Therapy Treatment    Patient Name: Nik Cota  MRN: [id number]  Today's Date: 2024  Time Calculation  Start Time: 07  Stop Time: 0750  Time Calculation (min): 45 min  General:  General  General Comment: visit #20    Current Problem  Problem List Items Addressed This Visit             ICD-10-CM    Right shoulder pain - Primary M25.511    Status post rotator cuff repair Z98.890    Edema of right upper extremity R60.0       Assessment:Patient identity confirmed today with name/. No additions today    Plan:  Treatment/Interventions: Manual therapy, Therapeutic exercises, Cryotherapy, Education/ Instruction, Vasopneumatic device  PT Plan: Skilled PT  PT Frequency:  (3x/wk for 6 weeks or 18 sessions)  Rehab Potential: Good  Plan of Care Agreement: Patient  Continue with clinic rehab treatments toward functional goals (   Subjective: I have  0 /10 pain right now.       Precautions  Precautions  Precautions Comment: No Fall risk,s/p R RTC Repair on 2023    Pain  Pain Assessment: 0-10  Pain Score: 0 - No pain  Pain Type: Surgical pain  Pain Location: Shoulder  Pain Orientation: Right    Treatments:  UBE x3' Fwd/x3' Bkwd for ROM  Pulleys x3' Flexion/Scaption   Resistive Rowing  Pink Tubing 3x10 reps    Resistive Shoulder Extension  Pink Tubing  3x10 reps    Seated press-ups: 2x10    Prone: ext, rows, horiz abd, flex: 3x10 ea    S/L ER: 0#, 2x10  Ball rolls on wall: 1 kg ball, cw, ccw, side/side, up/down, x15-20 ea  Shoulder circles x10 [X]  Standing isometrics Flex/Ext/Abduction x10 5\" hold [X - no time today]  Scap clocks 2x10 (X, not today)   Wand ER stretch (supine)X     Manual Therapy  Manual Therapy Performed: Yes  Manual Therapy: 8'  Inf/post glides gr2-3  PROM R shoulder: flex/ abd/ IR/ER  STW to R UT/mid scap [X]    OP EDUCATION:   Edu on proper form and speed of movement with ex's.  Pt verbalized/demonstrated good understanding.      Goals:  Active       PT Problem       PT Goal 1       " Start:  23    Expected End:  24       LTG's:  1) Improve  R shoulder strength to >= 4+/5 throughout in order to facilitate ability to reach overhead and lift objects as protocol allows.     4-6 weeks      2) Improve  R shoulder PROM to >= 165 deg flex/abd and 80 deg ER/IR in order to better reach overhead or behind back as protocol allows.       4-6 weeks      3) Improve R shoulder pain from   9/10 to <= 2-3/10 with activity in order to improve QOL.        4-6 weeks      4) Improve quick dash score by >= 5 points in order to improve QOL.    4-6 weeks      5) The pt will improve ability to raise arm/shoulder overhead, reach at various angles, lift/carry objects, dress upper body, overhead activity and return to exercise and work without significant limitation as protocol allows.  4-6 weeks    ST) Pt/caregiver will be I and consistent with HEP with use of handout as needed in order to maximize shoulder strength and ROM/flexibility.       2-3 weeks

## 2024-02-05 ENCOUNTER — TREATMENT (OUTPATIENT)
Dept: PHYSICAL THERAPY | Facility: CLINIC | Age: 39
End: 2024-02-05
Payer: OTHER GOVERNMENT

## 2024-02-05 DIAGNOSIS — R60.0 EDEMA OF RIGHT UPPER EXTREMITY: ICD-10-CM

## 2024-02-05 DIAGNOSIS — Z98.890 STATUS POST ROTATOR CUFF REPAIR: ICD-10-CM

## 2024-02-05 DIAGNOSIS — M25.511 RIGHT SHOULDER PAIN: ICD-10-CM

## 2024-02-05 PROCEDURE — 97110 THERAPEUTIC EXERCISES: CPT | Mod: GP,CQ

## 2024-02-05 PROCEDURE — 97140 MANUAL THERAPY 1/> REGIONS: CPT | Mod: GP,CQ

## 2024-02-05 ASSESSMENT — PAIN SCALES - GENERAL: PAINLEVEL_OUTOF10: 2

## 2024-02-05 ASSESSMENT — PAIN - FUNCTIONAL ASSESSMENT: PAIN_FUNCTIONAL_ASSESSMENT: 0-10

## 2024-02-05 NOTE — PROGRESS NOTES
"Physical Therapy Treatment    Patient Name: Nik Cota  MRN: [id number]  Today's Date: 2024  Time Calculation  Start Time: 705  Stop Time: 745  Time Calculation (min): 40 min      Assessment:   Patient identified with name and .   GH joint still tight during manual stretches but improving patient tolerance. Continuing therex as per physician protocol.    Plan:  OP PT Plan  Treatment/Interventions: Manual therapy, Therapeutic exercises, Cryotherapy, Education/ Instruction, Vasopneumatic device  PT Plan: Skilled PT  PT Frequency:  (3x/wk for 6 weeks or 18 sessions)  Rehab Potential: Good  Plan of Care Agreement: Patient    Current Problem  Problem List Items Addressed This Visit             ICD-10-CM    Right shoulder pain M25.511    Status post rotator cuff repair Z98.890    Edema of right upper extremity R60.0       Subjective   Had a real busy weekend with my [employer] stuff. Shoulder is just a little sore this morning for some reason.    Precautions  Precautions  Precautions Comment: No Fall risk,s/p R RTC Repair on 2023    Pain  Pain Assessment: 0-10  Pain Score: 2  Pain Type: Surgical pain  Pain Location: Shoulder  Pain Orientation: Right    Treatments:  Therapeutic Exercise  Therapeutic Exercise Performed: Yes  Therapeutic Exercise: 28 minutes, 2 units   UBE x3' Fwd/x3' Bkwd for ROM  Pulleys x3' Flexion/Scaption   Resistive Rowing  Pink Tubing 3x10 reps    Resistive Shoulder Extension  Pink Tubing  3x10 reps    Seated press-ups: 3x10    Prone: ext, rows, horiz abd, flex: 3x10 ea    S/L ER: 0#, 2x10  Ball rolls on wall: 1 kg ball, cw, ccw, side/side, up/down, x15-20 ea  Shoulder circles x10 [X]  Standing isometrics Flex/Ext/Abduction x10 5\" hold [X - no time today]  Scap clocks 2x10 (X, not today)   Wand ER stretch (supine)X    Manual Therapy  Manual Therapy Performed: Yes  Manual Therapy: 10 minutes, 1 units   PROM R shoulder: flex/ abd/ IR/ER     OP EDUCATION:   Educated in proper mechanics, " reducing substitutions during active elevation.    Goals:  Active       PT Problem       PT Goal 1       Start:  23    Expected End:  24       LTG's:  1) Improve  R shoulder strength to >= 4+/5 throughout in order to facilitate ability to reach overhead and lift objects as protocol allows.     4-6 weeks      2) Improve  R shoulder PROM to >= 165 deg flex/abd and 80 deg ER/IR in order to better reach overhead or behind back as protocol allows.       4-6 weeks      3) Improve R shoulder pain from   9/10 to <= 2-3/10 with activity in order to improve QOL.        4-6 weeks      4) Improve quick dash score by >= 5 points in order to improve QOL.    4-6 weeks      5) The pt will improve ability to raise arm/shoulder overhead, reach at various angles, lift/carry objects, dress upper body, overhead activity and return to exercise and work without significant limitation as protocol allows.  4-6 weeks    ST) Pt/caregiver will be I and consistent with HEP with use of handout as needed in order to maximize shoulder strength and ROM/flexibility.       2-3 weeks

## 2024-02-07 ENCOUNTER — TREATMENT (OUTPATIENT)
Dept: PHYSICAL THERAPY | Facility: CLINIC | Age: 39
End: 2024-02-07
Payer: OTHER GOVERNMENT

## 2024-02-07 DIAGNOSIS — R60.0 EDEMA OF RIGHT UPPER EXTREMITY: ICD-10-CM

## 2024-02-07 DIAGNOSIS — Z98.890 STATUS POST ROTATOR CUFF REPAIR: ICD-10-CM

## 2024-02-07 DIAGNOSIS — M25.511 RIGHT SHOULDER PAIN: ICD-10-CM

## 2024-02-07 PROCEDURE — 97140 MANUAL THERAPY 1/> REGIONS: CPT | Mod: GP,CQ

## 2024-02-07 PROCEDURE — 97110 THERAPEUTIC EXERCISES: CPT | Mod: GP,CQ

## 2024-02-07 ASSESSMENT — PAIN SCALES - GENERAL: PAINLEVEL_OUTOF10: 0 - NO PAIN

## 2024-02-07 ASSESSMENT — PAIN - FUNCTIONAL ASSESSMENT: PAIN_FUNCTIONAL_ASSESSMENT: 0-10

## 2024-02-07 NOTE — PROGRESS NOTES
Physical Therapy Treatment    Patient Name: Nik Cota  MRN: [id number]  Today's Date: 2024  Time Calculation  Start Time: 704  Stop Time: 747  Time Calculation (min): 43 min      Assessment:   Patient identified with name and .   Advancing therex as per protocol. Patient demonstrating better mechanics in right shoulder with active elevation but still presents some trap substitutions. Less c/o soreness in shoulder as compared to previous session.     Plan:  Will continue as poc to improve functional strength and motion in shoulder to allow return to PLOF.   OP PT Plan  Treatment/Interventions: Manual therapy, Therapeutic exercises, Cryotherapy, Education/ Instruction, Vasopneumatic device  PT Plan: Skilled PT  PT Frequency:  (3x/wk for 6 weeks or 18 sessions)  Rehab Potential: Good  Plan of Care Agreement: Patient    Current Problem  Problem List Items Addressed This Visit             ICD-10-CM    Right shoulder pain M25.511    Status post rotator cuff repair Z98.890    Edema of right upper extremity R60.0       Subjective   Decreased pain today but some muscle soreness around the shoulder blade area. Feels better after the ex's. Still noticing tightness in the shoulder.    Precautions  Precautions  Precautions Comment: No Fall risk,s/p R RTC Repair on 2023    Pain  Pain Assessment: 0-10  Pain Score: 0 - No pain  Pain Type: Surgical pain  Pain Location: Shoulder  Pain Orientation: Right    Treatments:  Therapeutic Exercise  Therapeutic Exercise Performed: Yes  Therapeutic Exercise: 32 minutes, 2 units   UBE x3' Fwd/x3' Bkwd for ROM  Pulleys x3' Flexion/Scaption   Resistive Rowing  Pink Tubing 3x10 reps    Resistive Shoulder Extension Pink Tubing  3x10 reps    Seated press-ups: 3x10    Prone: ext, rows, horiz abd, flex: 3x10 ea    S/L ER: 0#, 2x10  Ball rolls on wall: 1 kg ball, cw, ccw, side/side, up/down, x15-20 ea  Wall push-ups with plus: 2x10  [N]  Standing scapt: 1#, 2x10  [N]  Shoulder circles  "x10 [X]  Standing isometrics Flex/Ext/Abduction x10 5\" hold [X - no time today]  Scap clocks 2x10 (X, not today)   Wand ER stretch (supine)X    Manual Therapy  Manual Therapy Performed: Yes  Manual Therapy: 10 minutes, 1 units   PROM R shoulder: flex/ abd/ IR/ER   STW along lateral shoulder     OP EDUCATION:       Goals:  Active       PT Problem       PT Goal 1       Start:  23    Expected End:  24       LTG's:  1) Improve  R shoulder strength to >= 4+/5 throughout in order to facilitate ability to reach overhead and lift objects as protocol allows.     4-6 weeks      2) Improve  R shoulder PROM to >= 165 deg flex/abd and 80 deg ER/IR in order to better reach overhead or behind back as protocol allows.       4-6 weeks      3) Improve R shoulder pain from   9/10 to <= 2-3/10 with activity in order to improve QOL.        4-6 weeks      4) Improve quick dash score by >= 5 points in order to improve QOL.    4-6 weeks      5) The pt will improve ability to raise arm/shoulder overhead, reach at various angles, lift/carry objects, dress upper body, overhead activity and return to exercise and work without significant limitation as protocol allows.  4-6 weeks    ST) Pt/caregiver will be I and consistent with HEP with use of handout as needed in order to maximize shoulder strength and ROM/flexibility.       2-3 weeks              "

## 2024-02-12 ENCOUNTER — DOCUMENTATION (OUTPATIENT)
Dept: PHYSICAL THERAPY | Facility: CLINIC | Age: 39
End: 2024-02-12
Payer: OTHER GOVERNMENT

## 2024-02-12 NOTE — PROGRESS NOTES
Physical Therapy                 Therapy Communication Note    Patient Name: Nik Cota  MRN: [id number]  Today's Date: 2/12/2024     Discipline: Physical Therapy    Missed Visit Reason:  NC/NS for 0700 appt.    Missed Time: No Show    Comment:

## 2024-02-14 ENCOUNTER — TREATMENT (OUTPATIENT)
Dept: PHYSICAL THERAPY | Facility: CLINIC | Age: 39
End: 2024-02-14
Payer: OTHER GOVERNMENT

## 2024-02-14 DIAGNOSIS — M25.511 RIGHT SHOULDER PAIN: ICD-10-CM

## 2024-02-14 DIAGNOSIS — Z98.890 STATUS POST ROTATOR CUFF REPAIR: ICD-10-CM

## 2024-02-14 DIAGNOSIS — R60.0 EDEMA OF RIGHT UPPER EXTREMITY: ICD-10-CM

## 2024-02-14 PROCEDURE — 97110 THERAPEUTIC EXERCISES: CPT | Mod: GP,CQ

## 2024-02-14 PROCEDURE — 97140 MANUAL THERAPY 1/> REGIONS: CPT | Mod: GP,CQ

## 2024-02-14 ASSESSMENT — PAIN - FUNCTIONAL ASSESSMENT: PAIN_FUNCTIONAL_ASSESSMENT: 0-10

## 2024-02-14 ASSESSMENT — PAIN SCALES - GENERAL: PAINLEVEL_OUTOF10: 1

## 2024-02-14 NOTE — PROGRESS NOTES
"Physical Therapy Treatment    Patient Name: Nik Cota  MRN: [id number]  Today's Date: 2024  Time Calculation  Start Time: 703  Stop Time: 746  Time Calculation (min): 43 min      Assessment:   Patient identified with name and .   Less soreness during therex. ROM limited with pain as well as tightness but patient demonstrates improvements. Good patient effort throughout session.     Plan:  Will continue as poc to improve functional strength and motion in shoulder to allow return to PLOF.    OP PT Plan  Treatment/Interventions: Manual therapy, Therapeutic exercises, Cryotherapy, Education/ Instruction, Vasopneumatic device  PT Plan: Skilled PT  PT Frequency:  (3x/wk for 6 weeks or 18 sessions)  Rehab Potential: Good  Plan of Care Agreement: Patient    Current Problem  Problem List Items Addressed This Visit             ICD-10-CM    Right shoulder pain M25.511    Status post rotator cuff repair Z98.890    Edema of right upper extremity R60.0       Subjective   Not much change in the shoulder today. Trying to stretch at home when I can.     Precautions  Precautions  Precautions Comment: No Fall risk,s/p R RTC Repair on 2023    Pain  Pain Assessment: 0-10  Pain Score: 1  Pain Type: Surgical pain  Pain Location: Shoulder  Pain Orientation: Right    Treatments:  Therapeutic Exercise  Therapeutic Exercise Performed: Yes  Therapeutic Exercise: 31 minutes, 2 units   UBE x3' Fwd/x3' Bkwd for ROM  Pulleys x3' Flexion/Scaption   Resistive Rowing  Pink Tubing 3x10 reps    Resistive Shoulder Extension Pink Tubing  3x10 reps    Seated press-ups: 3x10    Prone: ext (1#), rows (1#), horiz abd (0#), flex (0#): 3x10 ea    S/L ER: 1#, 3x10  [P]  Ball rolls on wall: 1 kg ball, cw, ccw, side/side, up/down, x15-20 ea  Wall push-ups with plus: 2x10   Standing scapt: 1#, 3x10      Not Today:  Shoulder circles x10 [X]  Standing isometrics Flex/Ext/Abduction x10 5\" hold [X - no time today]  Scap clocks 2x10 (X, not today) "   Wand ER stretch (supine)X     Manual Therapy  Manual Therapy Performed: Yes  Manual Therapy: 10 minutes, 1 units   PROM R shoulder: flex/ abd/ IR/ER   STW along lateral shoulder    OP EDUCATION:   Educated in proper ex form and speed.    Goals:  Active       PT Problem       PT Goal 1       Start:  23    Expected End:  24       LTG's:  1) Improve  R shoulder strength to >= 4+/5 throughout in order to facilitate ability to reach overhead and lift objects as protocol allows.     4-6 weeks      2) Improve  R shoulder PROM to >= 165 deg flex/abd and 80 deg ER/IR in order to better reach overhead or behind back as protocol allows.       4-6 weeks      3) Improve R shoulder pain from   9/10 to <= 2-3/10 with activity in order to improve QOL.        4-6 weeks      4) Improve quick dash score by >= 5 points in order to improve QOL.    4-6 weeks      5) The pt will improve ability to raise arm/shoulder overhead, reach at various angles, lift/carry objects, dress upper body, overhead activity and return to exercise and work without significant limitation as protocol allows.  4-6 weeks    ST) Pt/caregiver will be I and consistent with HEP with use of handout as needed in order to maximize shoulder strength and ROM/flexibility.       2-3 weeks

## 2024-02-19 ENCOUNTER — TREATMENT (OUTPATIENT)
Dept: PHYSICAL THERAPY | Facility: CLINIC | Age: 39
End: 2024-02-19
Payer: OTHER GOVERNMENT

## 2024-02-19 DIAGNOSIS — Z98.890 STATUS POST ROTATOR CUFF REPAIR: ICD-10-CM

## 2024-02-19 DIAGNOSIS — M25.511 RIGHT SHOULDER PAIN: ICD-10-CM

## 2024-02-19 DIAGNOSIS — R60.0 EDEMA OF RIGHT UPPER EXTREMITY: ICD-10-CM

## 2024-02-19 PROCEDURE — 97140 MANUAL THERAPY 1/> REGIONS: CPT | Mod: GP,CQ

## 2024-02-19 PROCEDURE — 97110 THERAPEUTIC EXERCISES: CPT | Mod: GP,CQ

## 2024-02-19 ASSESSMENT — PAIN SCALES - GENERAL: PAINLEVEL_OUTOF10: 1

## 2024-02-19 ASSESSMENT — PAIN - FUNCTIONAL ASSESSMENT: PAIN_FUNCTIONAL_ASSESSMENT: 0-10

## 2024-02-19 NOTE — PROGRESS NOTES
Physical Therapy Treatment    Patient Name: Nik Cota  MRN: [id number]  Today's Date: 2024  Time Calculation  Start Time: 702  Stop Time: 747  Time Calculation (min): 45 min      Assessment:   Patient identified with name and .   Added light band resistance ex's to HEP and provided bands (orange and green) as well as handouts. Advancing therex as per protocol to continue progressing strength. Patient denies increase in pain following session.     Plan:  Will continue as poc to improve functional strength and motion in shoulder to allow return to PLOF.   OP PT Plan  Treatment/Interventions: Manual therapy, Therapeutic exercises, Cryotherapy, Education/ Instruction, Vasopneumatic device  PT Plan: Skilled PT  PT Frequency:  (3x/wk for 6 weeks or 18 sessions)  Rehab Potential: Good  Plan of Care Agreement: Patient    Current Problem  Problem List Items Addressed This Visit             ICD-10-CM    Right shoulder pain M25.511    Status post rotator cuff repair Z98.890    Edema of right upper extremity R60.0       Subjective   Still having some tightness and weakness. Trying to stretch at home every day but it feels like it's still tight.    Precautions  Precautions  Precautions Comment: No Fall risk,s/p R RTC Repair on 2023    Pain  Pain Assessment: 0-10  Pain Score: 1  Pain Type: Surgical pain  Pain Location: Shoulder  Pain Orientation: Right    Treatments:  Therapeutic Exercise  Therapeutic Exercise Performed: Yes  Therapeutic Exercise: 30 minutes, 2 units   UBE x3' Fwd/x3' Bkwd for ROM  Pulleys x3' Flexion/Scaption   Resistive Rowing  Pink Tubing 3x10 reps    Resistive Shoulder Extension Pink Tubing  3x10 reps    Seated press-ups: 3x10    Prone: ext (1#), rows (1#), horiz abd (1#), flex (1#): 3x10 ea    S/L ER: 1#, 3x10    Ball rolls on wall: 1 kg ball, cw, ccw, side/side, up/down, x15-20 ea  Standing scapt: 1#, 1x10    Standing flex: 1#, 1x10     Not Today:  Wall push-ups with plus: 2x10   Shoulder  "circles x10 [X]  Standing isometrics Flex/Ext/Abduction x10 5\" hold [X - no time today]  Scap clocks 2x10 (X, not today)   Wand ER stretch (supine)X    Manual Therapy  Manual Therapy Performed: Yes  Manual Therapy: 12 minutes, 1 units   PROM R shoulder: flex/ abd/ IR/ER   STW along lateral shoulder    OP EDUCATION:    Educated in proper ex form and speed.     Access Code: [email or web address]  URL: [email or web address]  Date: 2024  Prepared by: Mauricio Ellsworth    Exercises  - Standing Shoulder Horizontal Abduction with Resistance  - 1 x daily - 7 x weekly - 3 sets - 10 reps  - Shoulder External Rotation and Scapular Retraction with Resistance  - 1 x daily - 7 x weekly - 3 sets - 10 reps    Goals:  Active       PT Problem       PT Goal 1       Start:  23    Expected End:  24       LTG's:  1) Improve  R shoulder strength to >= 4+/5 throughout in order to facilitate ability to reach overhead and lift objects as protocol allows.     4-6 weeks      2) Improve  R shoulder PROM to >= 165 deg flex/abd and 80 deg ER/IR in order to better reach overhead or behind back as protocol allows.       4-6 weeks      3) Improve R shoulder pain from   9/10 to <= 2-3/10 with activity in order to improve QOL.        4-6 weeks      4) Improve quick dash score by >= 5 points in order to improve QOL.    4-6 weeks      5) The pt will improve ability to raise arm/shoulder overhead, reach at various angles, lift/carry objects, dress upper body, overhead activity and return to exercise and work without significant limitation as protocol allows.  4-6 weeks    ST) Pt/caregiver will be I and consistent with HEP with use of handout as needed in order to maximize shoulder strength and ROM/flexibility.       2-3 weeks              "

## 2024-02-21 ENCOUNTER — TREATMENT (OUTPATIENT)
Dept: PHYSICAL THERAPY | Facility: CLINIC | Age: 39
End: 2024-02-21
Payer: OTHER GOVERNMENT

## 2024-02-21 DIAGNOSIS — R60.0 EDEMA OF RIGHT UPPER EXTREMITY: ICD-10-CM

## 2024-02-21 DIAGNOSIS — M25.511 RIGHT SHOULDER PAIN: ICD-10-CM

## 2024-02-21 DIAGNOSIS — Z98.890 STATUS POST ROTATOR CUFF REPAIR: ICD-10-CM

## 2024-02-21 PROCEDURE — 97140 MANUAL THERAPY 1/> REGIONS: CPT | Mod: GP,CQ

## 2024-02-21 PROCEDURE — 97110 THERAPEUTIC EXERCISES: CPT | Mod: GP,CQ

## 2024-02-21 ASSESSMENT — PAIN SCALES - GENERAL: PAINLEVEL_OUTOF10: 0 - NO PAIN

## 2024-02-21 ASSESSMENT — PAIN - FUNCTIONAL ASSESSMENT: PAIN_FUNCTIONAL_ASSESSMENT: 0-10

## 2024-02-21 NOTE — PROGRESS NOTES
"Physical Therapy Treatment    Patient Name: Nik Cota  MRN: [id number]  Today's Date: 2024  Time Calculation  Start Time: 704  Stop Time: 745  Time Calculation (min): 41 min      Assessment:   Patient identified with name and .   Advancing ex's as per poc to continue strength and stamina progression. Patient tolerated increases well, denying pain post-treatment, only c/o fatigue.     Plan:  Will continue as poc to improve functional strength and motion in shoulder to allow return to PLOF.   OP PT Plan  Treatment/Interventions: Manual therapy, Therapeutic exercises, Cryotherapy, Education/ Instruction, Vasopneumatic device  PT Plan: Skilled PT  PT Frequency:  (3x/wk for 6 weeks or 18 sessions)  Rehab Potential: Good  Plan of Care Agreement: Patient    Current Problem  Problem List Items Addressed This Visit             ICD-10-CM    Right shoulder pain M25.511    Status post rotator cuff repair Z98.890    Edema of right upper extremity R60.0       Subjective   Shoulder feeling pretty good but still tight and weak at times. Not really having pain.     Precautions  Precautions  Precautions Comment: No Fall risk,s/p R RTC Repair on 2023    Pain  Pain Assessment: 0-10  Pain Score: 0 - No pain    Treatments:  Therapeutic Exercise  Therapeutic Exercise Performed: Yes  Therapeutic Exercise: 30 minutes, 2 units   UBE x3' Fwd/x3', L3  [P]  Pulleys x3' Flexion/Scaption   Resistive Rowing  orange tubing 3x10 reps  [P]  Resistive Shoulder Extension orange tubing  3x10 reps  [P]  Seated press-ups: 3x10    Prone (bent over table edge): ext (1#), horiz abd (1#), flex (1#): 3x10 ea    Bent-over rows: 8#, 2x15  [N]  S/L ER: 2#, 3x10  [P]  Ball rolls on wall: 1 kg ball, cw, ccw, side/side, up/down, x15-20 ea  Standing scapt: 1#, 1x10    Standing flex: 1#, 1x10      Not Today:  Wall push-ups with plus: 2x10   Shoulder circles x10 [X]  Standing isometrics Flex/Ext/Abduction x10 5\" hold [X - no time today]  Scap clocks " 2x10 (X, not today)   Wand ER stretch (supine)X     Manual Therapy  Manual Therapy Performed: Yes  Manual Therapy: 10 minutes, 1 units   PROM R shoulder: flex/ abd/ IR/ER   STW along lateral shoulder    OP EDUCATION:    Educated in proper ex form and speed.      Access Code: [email or web address]  URL: [email or web address]  Date: 2024  Prepared by: Mauricio Ellsworth     Exercises  - Standing Shoulder Horizontal Abduction with Resistance  - 1 x daily - 7 x weekly - 3 sets - 10 reps  - Shoulder External Rotation and Scapular Retraction with Resistance  - 1 x daily - 7 x weekly - 3 sets - 10 reps    Goals:  Active       PT Problem       PT Goal 1       Start:  23    Expected End:  24       LTG's:  1) Improve  R shoulder strength to >= 4+/5 throughout in order to facilitate ability to reach overhead and lift objects as protocol allows.     4-6 weeks      2) Improve  R shoulder PROM to >= 165 deg flex/abd and 80 deg ER/IR in order to better reach overhead or behind back as protocol allows.       4-6 weeks      3) Improve R shoulder pain from   9/10 to <= 2-3/10 with activity in order to improve QOL.        4-6 weeks      4) Improve quick dash score by >= 5 points in order to improve QOL.    4-6 weeks      5) The pt will improve ability to raise arm/shoulder overhead, reach at various angles, lift/carry objects, dress upper body, overhead activity and return to exercise and work without significant limitation as protocol allows.  4-6 weeks    ST) Pt/caregiver will be I and consistent with HEP with use of handout as needed in order to maximize shoulder strength and ROM/flexibility.       2-3 weeks

## 2024-02-26 ENCOUNTER — APPOINTMENT (OUTPATIENT)
Dept: PHYSICAL THERAPY | Facility: CLINIC | Age: 39
End: 2024-02-26
Payer: OTHER GOVERNMENT

## 2024-02-28 ENCOUNTER — DOCUMENTATION (OUTPATIENT)
Dept: PHYSICAL THERAPY | Facility: CLINIC | Age: 39
End: 2024-02-28
Payer: OTHER GOVERNMENT

## 2024-02-28 NOTE — PROGRESS NOTES
Physical Therapy                 Therapy Communication Note    Patient Name: Nik Cota  MRN: [id number]  Today's Date: 2/28/2024     Discipline: Physical Therapy    Missed Visit Reason:      Missed Time: No Show    Comment:

## 2024-03-04 ENCOUNTER — TREATMENT (OUTPATIENT)
Dept: PHYSICAL THERAPY | Facility: CLINIC | Age: 39
End: 2024-03-04
Payer: OTHER GOVERNMENT

## 2024-03-04 DIAGNOSIS — Z98.890 STATUS POST ROTATOR CUFF REPAIR: ICD-10-CM

## 2024-03-04 DIAGNOSIS — R60.0 EDEMA OF RIGHT UPPER EXTREMITY: ICD-10-CM

## 2024-03-04 DIAGNOSIS — M25.511 RIGHT SHOULDER PAIN: ICD-10-CM

## 2024-03-04 PROCEDURE — 97140 MANUAL THERAPY 1/> REGIONS: CPT | Mod: GP,CQ

## 2024-03-04 PROCEDURE — 97110 THERAPEUTIC EXERCISES: CPT | Mod: GP,CQ

## 2024-03-04 ASSESSMENT — PAIN - FUNCTIONAL ASSESSMENT: PAIN_FUNCTIONAL_ASSESSMENT: 0-10

## 2024-03-04 ASSESSMENT — PAIN SCALES - GENERAL: PAINLEVEL_OUTOF10: 0 - NO PAIN

## 2024-03-04 NOTE — PROGRESS NOTES
Physical Therapy Treatment    Patient Name: Nik Cota  MRN: [id number]  Today's Date: 3/4/2024  Time Calculation  Start Time: 702  Stop Time: 744  Time Calculation (min): 42 min      Assessment:   Patient identified with name and .   Missed visits last wk because of work schedule. Patient sees supervising PT next session for reassessment and has follow-up with physician tomorrow. Good tolerance to therapy session but still noting tightness and weakness in shoulder. Advancing activities as per protocol.     Plan:  Will continue activities to improve functional mobility and strength in RUE for ADLs and IADLs.   OP PT Plan  Treatment/Interventions: Manual therapy, Therapeutic exercises, Cryotherapy, Education/ Instruction, Vasopneumatic device  PT Plan: Skilled PT  PT Frequency:  (3x/wk for 6 weeks or 18 sessions)  Rehab Potential: Good  Plan of Care Agreement: Patient    Current Problem  Problem List Items Addressed This Visit             ICD-10-CM    Right shoulder pain M25.511    Status post rotator cuff repair Z98.890    Edema of right upper extremity R60.0       Subjective   Not really having any pain, just some tightness and weakness in the right shoulder.     Precautions  Precautions  Precautions Comment: No Fall risk,s/p R RTC Repair on 2023    Pain  Pain Assessment: 0-10  Pain Score: 0 - No pain    Treatments:  Therapeutic Exercise  Therapeutic Exercise Performed: Yes  Therapeutic Exercise: 31 minutes, 2 units   UBE x3' Fwd/x3', L3    Pulleys x3' Flexion/Scaption   Resistive Rowing  orange tubing 3x10 reps   Resistive Shoulder Extension orange tubing  3x10 reps   Prone (bent over table edge): ext (2#), horiz abd (2#), flex (2#): 3x10 ea  [P]  Bent-over rows: 10#, 2x15  [P]  S/L ER: 2#, 3x10    Ball rolls on wall: 1 kg ball, cw, ccw, side/side, up/down, x15-20 ea  Scapular clock (2:00, 3:00, 4:00) with yellow loop, 2x5 ea  [N]  Standing scapt: 2#, 2x10  [P]  Standing flex: 2#, 2x10  [P]     Not  "Today:  Wall push-ups with plus: 2x10   Shoulder circles x10 [X]  Standing isometrics Flex/Ext/Abduction x10 5\" hold [X - no time today]  Scap clocks 2x10 (X, not today)   Wand ER stretch (supine)X     Manual Therapy  Manual Therapy Performed: Yes  Manual Therapy: 10 minutes, 1 units   PROM R shoulder: flex/ abd/ IR/ER   STW along lateral shoulder     OP EDUCATION:    Educated in proper ex form and speed.      Access Code: [email or web address]  URL: [email or web address]  Date: 2024  Prepared by: Mauricio Ellsworth     Exercises  - Standing Shoulder Horizontal Abduction with Resistance  - 1 x daily - 7 x weekly - 3 sets - 10 reps  - Shoulder External Rotation and Scapular Retraction with Resistance  - 1 x daily - 7 x weekly - 3 sets - 10 reps       Goals:  Active       PT Problem       PT Goal 1       Start:  23    Expected End:  24       LTG's:  1) Improve  R shoulder strength to >= 4+/5 throughout in order to facilitate ability to reach overhead and lift objects as protocol allows.     4-6 weeks      2) Improve  R shoulder PROM to >= 165 deg flex/abd and 80 deg ER/IR in order to better reach overhead or behind back as protocol allows.       4-6 weeks      3) Improve R shoulder pain from   9/10 to <= 2-3/10 with activity in order to improve QOL.        4-6 weeks      4) Improve quick dash score by >= 5 points in order to improve QOL.    4-6 weeks      5) The pt will improve ability to raise arm/shoulder overhead, reach at various angles, lift/carry objects, dress upper body, overhead activity and return to exercise and work without significant limitation as protocol allows.  4-6 weeks    ST) Pt/caregiver will be I and consistent with HEP with use of handout as needed in order to maximize shoulder strength and ROM/flexibility.       2-3 weeks              "

## 2024-03-06 ENCOUNTER — APPOINTMENT (OUTPATIENT)
Dept: PHYSICAL THERAPY | Facility: CLINIC | Age: 39
End: 2024-03-06
Payer: OTHER GOVERNMENT

## 2024-03-07 ENCOUNTER — APPOINTMENT (OUTPATIENT)
Dept: PHYSICAL THERAPY | Facility: CLINIC | Age: 39
End: 2024-03-07
Payer: OTHER GOVERNMENT

## 2024-03-14 ENCOUNTER — DOCUMENTATION (OUTPATIENT)
Dept: PHYSICAL THERAPY | Facility: CLINIC | Age: 39
End: 2024-03-14

## 2024-03-14 NOTE — PROGRESS NOTES
Physical Therapy                 Therapy Communication Note    Patient Name: Nik Cota  MRN: [id number]  Today's Date: 3/14/2024     Discipline: Physical Therapy    Missed Visit Reason:      Missed Time: No Show    Comment:

## 2024-03-19 ENCOUNTER — OFFICE VISIT (OUTPATIENT)
Dept: PRIMARY CARE | Facility: CLINIC | Age: 39
End: 2024-03-19
Payer: OTHER GOVERNMENT

## 2024-03-19 ENCOUNTER — LAB (OUTPATIENT)
Dept: LAB | Facility: LAB | Age: 39
End: 2024-03-19
Payer: OTHER GOVERNMENT

## 2024-03-19 ENCOUNTER — TELEPHONE (OUTPATIENT)
Dept: PRIMARY CARE | Facility: CLINIC | Age: 39
End: 2024-03-19

## 2024-03-19 VITALS
SYSTOLIC BLOOD PRESSURE: 132 MMHG | HEIGHT: 71 IN | WEIGHT: 240.5 LBS | OXYGEN SATURATION: 97 % | HEART RATE: 85 BPM | BODY MASS INDEX: 33.67 KG/M2 | DIASTOLIC BLOOD PRESSURE: 84 MMHG

## 2024-03-19 DIAGNOSIS — R06.83 SNORING: ICD-10-CM

## 2024-03-19 DIAGNOSIS — F17.200 TOBACCO USE DISORDER: ICD-10-CM

## 2024-03-19 DIAGNOSIS — Z13.1 SCREENING FOR DIABETES MELLITUS: ICD-10-CM

## 2024-03-19 DIAGNOSIS — R53.83 OTHER FATIGUE: ICD-10-CM

## 2024-03-19 DIAGNOSIS — Z13.220 SCREENING, LIPID: ICD-10-CM

## 2024-03-19 DIAGNOSIS — Z78.9 ALCOHOL USE: ICD-10-CM

## 2024-03-19 DIAGNOSIS — Z00.00 ANNUAL PHYSICAL EXAM: Primary | ICD-10-CM

## 2024-03-19 DIAGNOSIS — G47.10 HYPERSOMNIA: ICD-10-CM

## 2024-03-19 DIAGNOSIS — R79.89 LOW VITAMIN B12 LEVEL: Primary | ICD-10-CM

## 2024-03-19 DIAGNOSIS — R79.89 ELEVATED LFTS: ICD-10-CM

## 2024-03-19 PROBLEM — F10.90 ALCOHOL USE: Status: ACTIVE | Noted: 2024-03-19

## 2024-03-19 LAB
ALBUMIN SERPL BCP-MCNC: 4.8 G/DL (ref 3.4–5)
ALP SERPL-CCNC: 55 U/L (ref 33–120)
ALT SERPL W P-5'-P-CCNC: 65 U/L (ref 10–52)
ANION GAP SERPL CALC-SCNC: 12 MMOL/L (ref 10–20)
AST SERPL W P-5'-P-CCNC: 29 U/L (ref 9–39)
BASOPHILS # BLD AUTO: 0.02 X10*3/UL (ref 0–0.1)
BASOPHILS NFR BLD AUTO: 0.3 %
BILIRUB SERPL-MCNC: 0.7 MG/DL (ref 0–1.2)
BUN SERPL-MCNC: 11 MG/DL (ref 6–23)
CALCIUM SERPL-MCNC: 9.6 MG/DL (ref 8.6–10.3)
CHLORIDE SERPL-SCNC: 103 MMOL/L (ref 98–107)
CHOLEST SERPL-MCNC: 262 MG/DL (ref 0–199)
CHOLESTEROL/HDL RATIO: 5.7
CO2 SERPL-SCNC: 26 MMOL/L (ref 21–32)
CREAT SERPL-MCNC: 0.98 MG/DL (ref 0.5–1.3)
EGFRCR SERPLBLD CKD-EPI 2021: >90 ML/MIN/1.73M*2
EOSINOPHIL # BLD AUTO: 0.05 X10*3/UL (ref 0–0.7)
EOSINOPHIL NFR BLD AUTO: 0.8 %
ERYTHROCYTE [DISTWIDTH] IN BLOOD BY AUTOMATED COUNT: 12 % (ref 11.5–14.5)
EST. AVERAGE GLUCOSE BLD GHB EST-MCNC: 111 MG/DL
GLUCOSE SERPL-MCNC: 95 MG/DL (ref 74–99)
HBA1C MFR BLD: 5.5 %
HCT VFR BLD AUTO: 45.2 % (ref 41–52)
HDLC SERPL-MCNC: 46 MG/DL
HGB BLD-MCNC: 15 G/DL (ref 13.5–17.5)
IMM GRANULOCYTES # BLD AUTO: 0.01 X10*3/UL (ref 0–0.7)
IMM GRANULOCYTES NFR BLD AUTO: 0.2 % (ref 0–0.9)
LDLC SERPL CALC-MCNC: 181 MG/DL
LYMPHOCYTES # BLD AUTO: 2.32 X10*3/UL (ref 1.2–4.8)
LYMPHOCYTES NFR BLD AUTO: 35.8 %
MCH RBC QN AUTO: 28.2 PG (ref 26–34)
MCHC RBC AUTO-ENTMCNC: 33.2 G/DL (ref 32–36)
MCV RBC AUTO: 85 FL (ref 80–100)
MONOCYTES # BLD AUTO: 0.56 X10*3/UL (ref 0.1–1)
MONOCYTES NFR BLD AUTO: 8.6 %
NEUTROPHILS # BLD AUTO: 3.52 X10*3/UL (ref 1.2–7.7)
NEUTROPHILS NFR BLD AUTO: 54.3 %
NON HDL CHOLESTEROL: 216 MG/DL (ref 0–149)
NRBC BLD-RTO: 0 /100 WBCS (ref 0–0)
PLATELET # BLD AUTO: 267 X10*3/UL (ref 150–450)
POTASSIUM SERPL-SCNC: 4.3 MMOL/L (ref 3.5–5.3)
PROT SERPL-MCNC: 7.3 G/DL (ref 6.4–8.2)
RBC # BLD AUTO: 5.32 X10*6/UL (ref 4.5–5.9)
SODIUM SERPL-SCNC: 137 MMOL/L (ref 136–145)
TRIGL SERPL-MCNC: 175 MG/DL (ref 0–149)
TSH SERPL-ACNC: 1.65 MIU/L (ref 0.44–3.98)
VIT B12 SERPL-MCNC: 274 PG/ML (ref 211–911)
VLDL: 35 MG/DL (ref 0–40)
WBC # BLD AUTO: 6.5 X10*3/UL (ref 4.4–11.3)

## 2024-03-19 PROCEDURE — 80061 LIPID PANEL: CPT

## 2024-03-19 PROCEDURE — 36415 COLL VENOUS BLD VENIPUNCTURE: CPT

## 2024-03-19 PROCEDURE — 82607 VITAMIN B-12: CPT

## 2024-03-19 PROCEDURE — 99213 OFFICE O/P EST LOW 20 MIN: CPT | Performed by: STUDENT IN AN ORGANIZED HEALTH CARE EDUCATION/TRAINING PROGRAM

## 2024-03-19 PROCEDURE — 99385 PREV VISIT NEW AGE 18-39: CPT | Performed by: STUDENT IN AN ORGANIZED HEALTH CARE EDUCATION/TRAINING PROGRAM

## 2024-03-19 PROCEDURE — 85025 COMPLETE CBC W/AUTO DIFF WBC: CPT

## 2024-03-19 PROCEDURE — 83036 HEMOGLOBIN GLYCOSYLATED A1C: CPT

## 2024-03-19 PROCEDURE — 80053 COMPREHEN METABOLIC PANEL: CPT

## 2024-03-19 PROCEDURE — 84443 ASSAY THYROID STIM HORMONE: CPT

## 2024-03-19 ASSESSMENT — PATIENT HEALTH QUESTIONNAIRE - PHQ9
2. FEELING DOWN, DEPRESSED OR HOPELESS: NOT AT ALL
SUM OF ALL RESPONSES TO PHQ9 QUESTIONS 1 AND 2: 0
1. LITTLE INTEREST OR PLEASURE IN DOING THINGS: NOT AT ALL

## 2024-03-19 NOTE — PROGRESS NOTES
Subjective:  Nik Cota is a 38 y.o. male who presents to clinic today for annual exam    Employment:  - What do you do for work or school?  for [employer]  - How is school/work going? Going well    Social History:  - Who lives with you at home? Wife, daughters    PHQ2- 0    (Provider to ask):    Pillars of health:  - Any issues with sleep? He doesn't sleep great  - How do you feel about your eating habits? Good, vegetables and Mediterranean diet  - What do you do to be physically active? golf And how often? irregularly  - Do you have any goals related to exercise or nutrition? Start exercising again  - How is your stress level? high Manageable or is it a problem? bothersome  - Any guns in the home? yes If yes, are they locked in a safe? yes    Sexual history (provider to ask):  - Have you been sexually active within the past year? Yes heterosexual  - What are the genders of your sexual partner(s)? female  - Are you or your spouse/partner wanting to become pregnant or have children in the next year? No  - If no, are you currently using any method to prevent pregnancy:? No    - Do you have a personal history of sexually transmitted infections (STI)?: Has never been diagnosed with an STI  - Have you ever been tested for HIV? no  - Do you want comprehensive STI testing today? No    Domestic Violence Screening (Provider to ask):  Because violence is so common in many people's lives and because there is help available for those being abused, I now ask every patient about domestic violence:  - Within the past year have you been hit, slapped, kicked or otherwise physically hurt by someone? no  - Are you in a relationship with a person who threatens or physically hurts you? no  - Has anyone forced you to have sexual activities that made you feel uncomfortable? no    Problem Based Visit:  Sleep:  - he tosses and turns  - he's very tired by afternoon  - sometimes difficulty with driving  - snoring  loudly at night  - wife notes that she can't sleep in the room  - he feels like he can't breath well through his nose  - when he had dshoulder surgery he was told he might have sleep apnea  Review of Systems    Assessment/Plan:  Nik SANDHU Cipriano Cota is a 38 y.o. male with a history of shoulder and ankle pain who presents to clinic today to address the following issues:   1. Annual physical exam        2. Tobacco use disorder        3. Snoring  Home sleep apnea test (HSAT)      4. Other fatigue  Home sleep apnea test (HSAT)    CBC and Auto Differential    Vitamin B12    Tsh With Reflex To Free T4 If Abnormal      5. Hypersomnia  Home sleep apnea test (HSAT)      6. Alcohol use  Comprehensive metabolic panel      7. Screening, lipid  Lipid panel      8. Screening for diabetes mellitus  Hemoglobin A1c      Annual Physical:  - no noted abnormalities, did discuss healthy lifestyle and encouraged regular physical activity  - screening labs as above  - discussed decreased alcohol usage is recommended and encouraged him to stop using tobacco containing products    Sleep:  - Chronic problem, unresolved, new to this provider, requires further workup and treatment   - Discussed with pt that he has several signs of sleep apnea, discussed home sleep testing and encouraged him to obtain this  - also discussed need for CPAP if postiive which he was in agreement with  - obtained screening labs for fatigue/hypersomnia including CBC, TSH and Vit B12    Patient Instructions   Routine adult health maintenance   It sounds as if things are going well. Keep up the good work!     Exercise:   - Try for at least 150 minutes of cardiovascular (strenuous) exercise per week.   Safety:   - Wear your seat belt at all times when in a car and NO TEXTING/CELL PHONES while driving.   - Wear a helmet while biking, using a motorcycle, skiing/snow boarding, skate/long boarding, or any activities faster than running.   - Avoid smoking.   - If you have a  "gun it needs to be locked up and stored unloaded away from children.   Nutrition:   - Drink plenty of water each day   - No more than 2 alcoholic drinks per day for men. No more than 1 alcoholic drink per day for woman.   - Read labels for calories   - Use website \"My Fitness Pal\" for free calorie counting tool when possible.   Stress:   - Make time for activities that allow you to decrease stress and recognize any red flags of stress for you to know when to activate your stress release mechanisms.   Sleep:   - Try to get 7-9 hours of sleep each night.   Preventative Care:   - Follow-up yearly for your annual exams   - For most people, the following are indicated:  - reproductive age females prenatal vitamin daily  - Colonoscopies for colon cancer screening starting at age 45, then every 3-10 years  - Mammograms for breast cancer screening every 1-2 years starting at age 40  - Annual flu vaccination   - Bone density screening at age 65   - Pneumonia vaccination at age 65 (some people need this before age 65, so ask your doctor!)  - Shingles vaccination (shingrix) at age 50     Follow up: 6months or sooner as needed    Return precautions discussed.  An After Visit Summary was given to the patient.  All questions were answered and patient in agreement with plan.    Objective:  /84   Pulse 85   Ht 1.803 m (5' 11\")   Wt 109 kg (240 lb 8 oz)   SpO2 97%   BMI 33.54 kg/m²     Physical Exam  Vitals and nursing note reviewed.   Constitutional:       General: He is not in acute distress.     Appearance: Normal appearance. He is not ill-appearing.   HENT:      Head: Normocephalic and atraumatic.      Right Ear: Tympanic membrane, ear canal and external ear normal. There is no impacted cerumen.      Left Ear: Tympanic membrane, ear canal and external ear normal. There is no impacted cerumen.      Nose: Nose normal. No congestion.      Mouth/Throat:      Mouth: Mucous membranes are moist.      Pharynx: No " "oropharyngeal exudate or posterior oropharyngeal erythema.   Eyes:      General: No scleral icterus.        Right eye: No discharge.         Left eye: No discharge.      Extraocular Movements: Extraocular movements intact.      Conjunctiva/sclera: Conjunctivae normal.      Pupils: Pupils are equal, round, and reactive to light.   Cardiovascular:      Rate and Rhythm: Normal rate and regular rhythm.   Pulmonary:      Effort: Pulmonary effort is normal. No respiratory distress.      Breath sounds: Normal breath sounds. No wheezing.   Abdominal:      General: Bowel sounds are normal. There is no distension.      Palpations: Abdomen is soft.      Tenderness: There is no abdominal tenderness.   Musculoskeletal:      Cervical back: Normal range of motion and neck supple. No tenderness.      Right lower leg: No edema.      Left lower leg: No edema.   Lymphadenopathy:      Cervical: No cervical adenopathy.   Skin:     General: Skin is warm and dry.   Neurological:      General: No focal deficit present.      Mental Status: He is alert and oriented to person, place, and time.      Sensory: No sensory deficit.      Motor: No weakness.      Deep Tendon Reflexes: Reflexes normal.   Psychiatric:         Mood and Affect: Mood normal.         Behavior: Behavior normal.         Thought Content: Thought content normal.         Judgment: Judgment normal.         LABS:   No results found for: \"CHOL\", \"LDL\", \"HDL\", \"HGBA1C\"    The ASCVD Risk score (Ezra DK, et al., 2019) failed to calculate for the following reasons:    The 2019 ASCVD risk score is only valid for ages 40 to 79    IMAGES:   No new images     I spent 28 minutes in total time for this visit including all related clinical activities before, during, and after the visit excluding other billable activities/procedure time.     Beti Escalante MD    "

## 2024-03-19 NOTE — PATIENT INSTRUCTIONS
"Routine adult health maintenance   It sounds as if things are going well. Keep up the good work!     Exercise:   - Try for at least 150 minutes of cardiovascular (strenuous) exercise per week.   Safety:   - Wear your seat belt at all times when in a car and NO TEXTING/CELL PHONES while driving.   - Wear a helmet while biking, using a motorcycle, skiing/snow boarding, skate/long boarding, or any activities faster than running.   - Avoid smoking.   - If you have a gun it needs to be locked up and stored unloaded away from children.   Nutrition:   - Drink plenty of water each day   - No more than 2 alcoholic drinks per day for men. No more than 1 alcoholic drink per day for woman.   - Read labels for calories   - Use website \"My Fitness Pal\" for free calorie counting tool when possible.   Stress:   - Make time for activities that allow you to decrease stress and recognize any red flags of stress for you to know when to activate your stress release mechanisms.   Sleep:   - Try to get 7-9 hours of sleep each night.   Preventative Care:   - Follow-up yearly for your annual exams   - For most people, the following are indicated:  - reproductive age females prenatal vitamin daily  - Colonoscopies for colon cancer screening starting at age 45, then every 3-10 years  - Mammograms for breast cancer screening every 1-2 years starting at age 40  - Annual flu vaccination   - Bone density screening at age 65   - Pneumonia vaccination at age 65 (some people need this before age 65, so ask your doctor!)  - Shingles vaccination (shingrix) at age 50   "

## 2024-03-22 ENCOUNTER — OFFICE VISIT (OUTPATIENT)
Dept: URGENT CARE | Facility: CLINIC | Age: 39
End: 2024-03-22
Payer: OTHER GOVERNMENT

## 2024-03-22 VITALS
WEIGHT: 240 LBS | RESPIRATION RATE: 18 BRPM | OXYGEN SATURATION: 98 % | SYSTOLIC BLOOD PRESSURE: 127 MMHG | HEART RATE: 92 BPM | TEMPERATURE: 98.9 F | BODY MASS INDEX: 33.6 KG/M2 | DIASTOLIC BLOOD PRESSURE: 89 MMHG | HEIGHT: 71 IN

## 2024-03-22 DIAGNOSIS — J01.00 ACUTE MAXILLARY SINUSITIS, RECURRENCE NOT SPECIFIED: Primary | ICD-10-CM

## 2024-03-22 PROCEDURE — 99213 OFFICE O/P EST LOW 20 MIN: CPT | Performed by: NURSE PRACTITIONER

## 2024-03-22 RX ORDER — AMOXICILLIN AND CLAVULANATE POTASSIUM 875; 125 MG/1; MG/1
1 TABLET, FILM COATED ORAL 2 TIMES DAILY
Qty: 20 TABLET | Refills: 0 | Status: SHIPPED | OUTPATIENT
Start: 2024-03-22 | End: 2024-04-01

## 2024-03-22 NOTE — PROGRESS NOTES
[facility] URGENT [facility]  Aislinn Green, APRN-CNP     Visit Note - 3/22/2024 11:06 AM   This note was generated with voice recognition software and may contain errors including spelling, grammar, syntax, and misrecognization of what was dictated.    Patient: Nik Cota, MRN: [id number], 38 y.o., male   PCP: Beti Escalante MD  ------------------------------------  ALLERGIES: No Known Allergies     CURRENT MEDICATIONS:   Current Outpatient Medications   Medication Instructions    amoxicillin-pot clavulanate (Augmentin) 875-125 mg tablet 1 tablet, oral, 2 times daily, Take with a meal.     ------------------------------------  PAST MEDICAL HX:  Patient Active Problem List   Diagnosis    Tear of right glenoid labrum    Traumatic incomplete tear of right rotator cuff    Right shoulder pain    Status post rotator cuff repair    Edema of right upper extremity    Tobacco use disorder    Alcohol use    Snoring      SURGICAL HX:  Past Surgical History:   Procedure Laterality Date    EYE SURGERY Left     multiple    ROTATOR CUFF REPAIR Right       FAMILY HX:   No pertinent history.   SOCIAL HX:    reports that he has never smoked. He has been exposed to tobacco smoke. His smokeless tobacco use includes chew. Employed - is a manager for [employer].   ------------------------------------  CHIEF COMPLAINT:   Chief Complaint   Patient presents with    Sinus Problem     Sinus pain/pressure, sore throat x 2 days       HISTORY OF PRESENT ILLNESS: The history was obtained from patientShiva Zaragoza is a 38 y.o. male, who presents with a chief complaint of a sore throat, sinus pain/congestion (clear mucus), L ear fullness, headaches, and a mild, dry cough - sxs started on Wednesday. Denies any fever/chills, body aches, ear pain, abdominal pain, chest pain, wheezing/shortness of breath, rashes, urinary symptoms, nausea/vomiting, and diarrhea. Denies any changes in mental status. No swelling in legs. Appetite is normal; is able to  "eat and drink fluids without difficulty; denies loss of sense of taste or smell. Reports symptoms have gotten worse since onset. Has not tried any over-the-counter medications or home remedies for symptom management.  Reports his wife and daughter have been sick recently with similar sxs;; no other known ill contacts. Has received the COVID vaccine x 1 . Last known COVID infection was in 2022.  Is not a smoker.  Has history of sports-induced asthma - denies any s/sxs of current flare. No recent antibiotic use.      REVIEW OF SYSTEMS:  10 systems reviewed negative with exception of history of present illness as listed above.    TODAY'S VITALS: /89 (BP Location: Right arm, Patient Position: Sitting, BP Cuff Size: Large adult)   Pulse 92   Temp 37.2 °C (98.9 °F) (Temporal)   Resp 18   Ht 1.803 m (5' 11\")   Wt 109 kg (240 lb)   SpO2 98%   BMI 33.47 kg/m²     PHYSICAL EXAMINATION:  General: Mildly ill-appearing, well nourished male; alert and oriented, in no acute distress. + audible nasal congestion.  Eyes:  Pupils equal, round and reactive to light. No conjunctival erythema; no scleral icterus.   HENT: + L sided maxillary sinus tenderness, with audible nasal congestion. Bilat ear canals clear/unremarkable, but TMs with clear effusions bilat; no erythema, and not bulging. Nasal mucosa moderately boggy and edematous. Airway patent, oral mucosa moist. Posterior pharynx mildly injected but without vesicles or oropharyngeal exudate aside from PND. Uvula is midline. Trachea is midline. Managing oral secretions without difficulty.  Neck:  Supple. Tender, mobile anterior cervical lymphadenopathy bilat.  Respiratory:  Lungs are clear to auscultation; no wheezes, rhonchi, or rales. Respirations unlabored, Breath sounds are equal, Symmetrical chest wall expansion. Mild, non-productive cough noted.   Cardiovascular:  Normal rate, Regular rhythm. Normal S1S2. No m/r/g. No peripheral edema.   Gastrointestinal:  Soft, " "non-tender, non-distended; no palpable masses or organomegaly. Bowel sounds normoactive.  Musculoskeletal:  Grossly normal  Integumentary:  Pink, warm, dry, and intact. No rashes or skin discoloration appreciated.    Neurologic:  Alert and oriented, no focal deficits, no motor or sensory deficits.    Cognition and Speech:  Oriented, Speech clear and coherent.    Psychiatric:  Cooperative, Appropriate mood & affect.       ------------------------------------  Medical Decision Making  LABORATORY or RADIOLOGICAL IMAGING ORDERS/RESULTS:   None    IMPRESSION/PLAN:  Course: Worsening; stable    1. Acute maxillary sinusitis, recurrence not specified  - amoxicillin-pot clavulanate (Augmentin) 875-125 mg tablet; Take 1 tablet by mouth 2 times a day for 10 days. Take with a meal.  Dispense: 20 tablet; Refill: 0    No red flags on exam today. Symptoms consistent with viral sinusitis with associated symptoms, but reviewed other potential etiologies. He declines testing for COVID/influenza today, but encouraged precautionary measures and monitoring. Per pt's request (reports he is going on vacation next week), rx for \"watch and wait\" Augmentin provided, with instructions to begin only if sxs not improving over the next 5-7 days. If started, advised should finish full course of antibiotics, even if symptoms resolve more quickly. In the meantime, encouraged to continue conservative measures - instructed to push fluids, rest, and to use appropriate over the counter medications as needed for management of symptoms - discussed that Mucinex, vaporizer, Saline Nasal [place] may be helpful. Reviewed instructions for self-isolation and continued monitoring. Reviewed red flags to monitor for, counseled on potential adverse reactions of treatments, expectations for improvement in sxs, and advised to follow-up with primary care provider in 3-5 days if symptoms persist, or sooner if worsening or if any additional concerns/red flags develop.  " Patient verbalized understanding and agreed with plan of care; questions were encouraged and answered.     EDGARDO Tariq-CNP   Advanced Practice Provider  [facility] URGENT [facility]

## 2024-04-08 ENCOUNTER — DOCUMENTATION (OUTPATIENT)
Dept: PHYSICAL THERAPY | Facility: CLINIC | Age: 39
End: 2024-04-08
Payer: OTHER GOVERNMENT

## 2024-04-08 NOTE — PROGRESS NOTES
Physical Therapy    Discharge Summary    Name: Nik Cota  MRN: [id number]  : 1985  Date: 24    Discharge Summary: PT    Discharge Information: Date of discharge 2024 Non-Visit DC    Therapy Summary: Pt was making very good progress but stopped attending visits and has not been seen in the clinic since 2024. PT goals were not formally assessed since his last reassessment and Non-Visit DC was completed due to this.      Discharge Status:  DC 'd via Non-Visit DC.      Rehab Discharge Reason: Failed to schedule and/or keep follow-up appointment(s)

## 2024-05-06 ENCOUNTER — CLINICAL SUPPORT (OUTPATIENT)
Dept: SLEEP MEDICINE | Facility: HOSPITAL | Age: 39
End: 2024-05-06
Payer: OTHER GOVERNMENT

## 2024-05-06 DIAGNOSIS — G47.10 HYPERSOMNIA: ICD-10-CM

## 2024-05-06 DIAGNOSIS — R06.83 SNORING: ICD-10-CM

## 2024-05-06 DIAGNOSIS — R53.83 OTHER FATIGUE: ICD-10-CM

## 2024-05-06 PROCEDURE — 95806 SLEEP STUDY UNATT&RESP EFFT: CPT | Performed by: STUDENT IN AN ORGANIZED HEALTH CARE EDUCATION/TRAINING PROGRAM

## 2024-09-19 ENCOUNTER — APPOINTMENT (OUTPATIENT)
Dept: PRIMARY CARE | Facility: CLINIC | Age: 39
End: 2024-09-19
Payer: OTHER GOVERNMENT

## 2025-02-02 ENCOUNTER — HOSPITAL ENCOUNTER (OUTPATIENT)
Dept: CARDIOLOGY | Facility: HOSPITAL | Age: 40
Discharge: HOME | End: 2025-02-02
Payer: OTHER GOVERNMENT

## 2025-02-02 ENCOUNTER — HOSPITAL ENCOUNTER (EMERGENCY)
Facility: HOSPITAL | Age: 40
Discharge: HOME | End: 2025-02-02
Attending: EMERGENCY MEDICINE
Payer: OTHER GOVERNMENT

## 2025-02-02 ENCOUNTER — APPOINTMENT (OUTPATIENT)
Dept: RADIOLOGY | Facility: HOSPITAL | Age: 40
End: 2025-02-02
Payer: OTHER GOVERNMENT

## 2025-02-02 VITALS
OXYGEN SATURATION: 96 % | SYSTOLIC BLOOD PRESSURE: 149 MMHG | BODY MASS INDEX: 32.9 KG/M2 | TEMPERATURE: 98 F | HEART RATE: 83 BPM | RESPIRATION RATE: 18 BRPM | WEIGHT: 235 LBS | HEIGHT: 71 IN | DIASTOLIC BLOOD PRESSURE: 96 MMHG

## 2025-02-02 DIAGNOSIS — R07.89 ATYPICAL CHEST PAIN: Primary | ICD-10-CM

## 2025-02-02 LAB
ALBUMIN SERPL BCP-MCNC: 4.7 G/DL (ref 3.4–5)
ALP SERPL-CCNC: 60 U/L (ref 33–120)
ALT SERPL W P-5'-P-CCNC: 55 U/L (ref 10–52)
ANION GAP SERPL CALC-SCNC: 14 MMOL/L (ref 10–20)
APPEARANCE UR: CLEAR
APTT PPP: 30 SECONDS (ref 27–38)
AST SERPL W P-5'-P-CCNC: 29 U/L (ref 9–39)
BASOPHILS # BLD AUTO: 0.02 X10*3/UL (ref 0–0.1)
BASOPHILS NFR BLD AUTO: 0.3 %
BILIRUB DIRECT SERPL-MCNC: 0.1 MG/DL (ref 0–0.3)
BILIRUB SERPL-MCNC: 0.7 MG/DL (ref 0–1.2)
BILIRUB UR STRIP.AUTO-MCNC: NEGATIVE MG/DL
BUN SERPL-MCNC: 12 MG/DL (ref 6–23)
CALCIUM SERPL-MCNC: 9.3 MG/DL (ref 8.6–10.3)
CARDIAC TROPONIN I PNL SERPL HS: 3 NG/L (ref 0–20)
CARDIAC TROPONIN I PNL SERPL HS: 3 NG/L (ref 0–20)
CHLORIDE SERPL-SCNC: 103 MMOL/L (ref 98–107)
CO2 SERPL-SCNC: 24 MMOL/L (ref 21–32)
COLOR UR: COLORLESS
CREAT SERPL-MCNC: 0.91 MG/DL (ref 0.5–1.3)
D DIMER PPP FEU-MCNC: 353 NG/ML FEU
EGFRCR SERPLBLD CKD-EPI 2021: >90 ML/MIN/1.73M*2
EOSINOPHIL # BLD AUTO: 0.06 X10*3/UL (ref 0–0.7)
EOSINOPHIL NFR BLD AUTO: 1 %
ERYTHROCYTE [DISTWIDTH] IN BLOOD BY AUTOMATED COUNT: 12.4 % (ref 11.5–14.5)
FLUAV RNA RESP QL NAA+PROBE: NOT DETECTED
FLUBV RNA RESP QL NAA+PROBE: NOT DETECTED
GLUCOSE SERPL-MCNC: 129 MG/DL (ref 74–99)
GLUCOSE UR STRIP.AUTO-MCNC: NORMAL MG/DL
HCT VFR BLD AUTO: 42.7 % (ref 41–52)
HGB BLD-MCNC: 14.3 G/DL (ref 13.5–17.5)
IMM GRANULOCYTES # BLD AUTO: 0.02 X10*3/UL (ref 0–0.7)
IMM GRANULOCYTES NFR BLD AUTO: 0.3 % (ref 0–0.9)
INR PPP: 1.2 (ref 0.9–1.1)
KETONES UR STRIP.AUTO-MCNC: NEGATIVE MG/DL
LEUKOCYTE ESTERASE UR QL STRIP.AUTO: NEGATIVE
LIPASE SERPL-CCNC: 39 U/L (ref 9–82)
LYMPHOCYTES # BLD AUTO: 1.77 X10*3/UL (ref 1.2–4.8)
LYMPHOCYTES NFR BLD AUTO: 30.2 %
MAGNESIUM SERPL-MCNC: 2.02 MG/DL (ref 1.6–2.4)
MCH RBC QN AUTO: 28.3 PG (ref 26–34)
MCHC RBC AUTO-ENTMCNC: 33.5 G/DL (ref 32–36)
MCV RBC AUTO: 84 FL (ref 80–100)
MONOCYTES # BLD AUTO: 0.44 X10*3/UL (ref 0.1–1)
MONOCYTES NFR BLD AUTO: 7.5 %
NEUTROPHILS # BLD AUTO: 3.55 X10*3/UL (ref 1.2–7.7)
NEUTROPHILS NFR BLD AUTO: 60.7 %
NITRITE UR QL STRIP.AUTO: NEGATIVE
NRBC BLD-RTO: 0 /100 WBCS (ref 0–0)
PH UR STRIP.AUTO: 6 [PH]
PLATELET # BLD AUTO: 207 X10*3/UL (ref 150–450)
POTASSIUM SERPL-SCNC: 3.6 MMOL/L (ref 3.5–5.3)
PROT SERPL-MCNC: 7.4 G/DL (ref 6.4–8.2)
PROT UR STRIP.AUTO-MCNC: NEGATIVE MG/DL
PROTHROMBIN TIME: 13.5 SECONDS (ref 9.8–12.8)
RBC # BLD AUTO: 5.06 X10*6/UL (ref 4.5–5.9)
RBC # UR STRIP.AUTO: NEGATIVE /UL
SARS-COV-2 RNA RESP QL NAA+PROBE: NOT DETECTED
SODIUM SERPL-SCNC: 137 MMOL/L (ref 136–145)
SP GR UR STRIP.AUTO: 1.01
UROBILINOGEN UR STRIP.AUTO-MCNC: NORMAL MG/DL
WBC # BLD AUTO: 5.9 X10*3/UL (ref 4.4–11.3)

## 2025-02-02 PROCEDURE — 80053 COMPREHEN METABOLIC PANEL: CPT | Performed by: EMERGENCY MEDICINE

## 2025-02-02 PROCEDURE — 81003 URINALYSIS AUTO W/O SCOPE: CPT | Performed by: EMERGENCY MEDICINE

## 2025-02-02 PROCEDURE — 87636 SARSCOV2 & INF A&B AMP PRB: CPT | Performed by: EMERGENCY MEDICINE

## 2025-02-02 PROCEDURE — 2500000001 HC RX 250 WO HCPCS SELF ADMINISTERED DRUGS (ALT 637 FOR MEDICARE OP): Performed by: EMERGENCY MEDICINE

## 2025-02-02 PROCEDURE — 71045 X-RAY EXAM CHEST 1 VIEW: CPT | Mod: FOREIGN READ | Performed by: RADIOLOGY

## 2025-02-02 PROCEDURE — 82374 ASSAY BLOOD CARBON DIOXIDE: CPT | Performed by: EMERGENCY MEDICINE

## 2025-02-02 PROCEDURE — 85379 FIBRIN DEGRADATION QUANT: CPT | Performed by: EMERGENCY MEDICINE

## 2025-02-02 PROCEDURE — 82248 BILIRUBIN DIRECT: CPT | Performed by: EMERGENCY MEDICINE

## 2025-02-02 PROCEDURE — 80076 HEPATIC FUNCTION PANEL: CPT | Performed by: EMERGENCY MEDICINE

## 2025-02-02 PROCEDURE — 85610 PROTHROMBIN TIME: CPT | Performed by: EMERGENCY MEDICINE

## 2025-02-02 PROCEDURE — 83735 ASSAY OF MAGNESIUM: CPT | Performed by: EMERGENCY MEDICINE

## 2025-02-02 PROCEDURE — 84484 ASSAY OF TROPONIN QUANT: CPT | Performed by: EMERGENCY MEDICINE

## 2025-02-02 PROCEDURE — 36415 COLL VENOUS BLD VENIPUNCTURE: CPT | Performed by: EMERGENCY MEDICINE

## 2025-02-02 PROCEDURE — 85025 COMPLETE CBC W/AUTO DIFF WBC: CPT | Performed by: EMERGENCY MEDICINE

## 2025-02-02 PROCEDURE — 85730 THROMBOPLASTIN TIME PARTIAL: CPT | Performed by: EMERGENCY MEDICINE

## 2025-02-02 PROCEDURE — 83690 ASSAY OF LIPASE: CPT | Performed by: EMERGENCY MEDICINE

## 2025-02-02 PROCEDURE — 71045 X-RAY EXAM CHEST 1 VIEW: CPT

## 2025-02-02 PROCEDURE — 99284 EMERGENCY DEPT VISIT MOD MDM: CPT | Performed by: EMERGENCY MEDICINE

## 2025-02-02 PROCEDURE — 93005 ELECTROCARDIOGRAM TRACING: CPT

## 2025-02-02 PROCEDURE — 99285 EMERGENCY DEPT VISIT HI MDM: CPT

## 2025-02-02 RX ORDER — ASPIRIN 325 MG
325 TABLET ORAL ONCE
Status: COMPLETED | OUTPATIENT
Start: 2025-02-02 | End: 2025-02-02

## 2025-02-02 RX ADMIN — ASPIRIN 325 MG ORAL TABLET 325 MG: 325 PILL ORAL at 13:11

## 2025-02-02 ASSESSMENT — HEART SCORE
AGE: <45
RISK FACTORS: NO KNOWN RISK FACTORS
HEART SCORE: 0
TROPONIN: LESS THAN OR EQUAL TO NORMAL LIMIT
ECG: NORMAL
HISTORY: SLIGHTLY SUSPICIOUS

## 2025-02-02 ASSESSMENT — ENCOUNTER SYMPTOMS
LIGHT-HEADEDNESS: 1
DIZZINESS: 0
PSYCHIATRIC NEGATIVE: 1
ABDOMINAL PAIN: 0
VOMITING: 0
HEMATOLOGIC/LYMPHATIC NEGATIVE: 1
SHORTNESS OF BREATH: 0
NAUSEA: 0
CHILLS: 0
CHEST TIGHTNESS: 1
COUGH: 0
FEVER: 0
MYALGIAS: 1
FLANK PAIN: 0
EYES NEGATIVE: 1
PALPITATIONS: 1
FREQUENCY: 0
ARTHRALGIAS: 1

## 2025-02-02 ASSESSMENT — PAIN SCALES - GENERAL
PAINLEVEL_OUTOF10: 0 - NO PAIN
PAINLEVEL_OUTOF10: 4
PAINLEVEL_OUTOF10: 4

## 2025-02-02 ASSESSMENT — PAIN - FUNCTIONAL ASSESSMENT
PAIN_FUNCTIONAL_ASSESSMENT: 0-10
PAIN_FUNCTIONAL_ASSESSMENT: 0-10

## 2025-02-02 ASSESSMENT — COLUMBIA-SUICIDE SEVERITY RATING SCALE - C-SSRS
1. IN THE PAST MONTH, HAVE YOU WISHED YOU WERE DEAD OR WISHED YOU COULD GO TO SLEEP AND NOT WAKE UP?: NO
2. HAVE YOU ACTUALLY HAD ANY THOUGHTS OF KILLING YOURSELF?: NO
6. HAVE YOU EVER DONE ANYTHING, STARTED TO DO ANYTHING, OR PREPARED TO DO ANYTHING TO END YOUR LIFE?: NO

## 2025-02-02 ASSESSMENT — PAIN DESCRIPTION - DESCRIPTORS
DESCRIPTORS: TIGHTNESS
DESCRIPTORS: TIGHTNESS

## 2025-02-02 ASSESSMENT — PAIN DESCRIPTION - LOCATION: LOCATION: CHEST

## 2025-02-02 ASSESSMENT — PAIN DESCRIPTION - PAIN TYPE: TYPE: ACUTE PAIN

## 2025-02-02 NOTE — ED PROVIDER NOTES
Chief Complaint: CHEST DISCOMFORT    39-year-old male who had some chest discomfort around Thanksgiving time he saw his primary care physician at the [facility] who did an EKG which was normal and was told if he had any more episodes were turned to the emergency department directly.  For the last several days he has had some vague discomfort in the left shoulder chest area.  He denied any nausea vomiting or diaphoresis he thought he had a transient rapid heartbeat but was anxious at that time and he presents now for evaluation denies any fever chills he has no history of cardiac disease there is no family history of cardiac disease he does not smoke or drink.  Did say he had some flulike symptoms earlier in January but those resolved.           Review of Systems   Constitutional:  Negative for chills and fever.   HENT: Negative.     Eyes: Negative.    Respiratory:  Positive for chest tightness. Negative for cough and shortness of breath.    Cardiovascular:  Positive for chest pain and palpitations. Negative for leg swelling.   Gastrointestinal:  Negative for abdominal pain, nausea and vomiting.   Genitourinary:  Negative for flank pain and frequency.   Musculoskeletal:  Positive for arthralgias and myalgias.   Skin:  Negative for rash.   Neurological:  Positive for light-headedness. Negative for dizziness.   Hematological: Negative.    Psychiatric/Behavioral: Negative.     All other systems reviewed and are negative.       Physical Exam  Vitals and nursing note reviewed.   Constitutional:       General: He is not in acute distress.     Appearance: He is well-developed and normal weight. He is not ill-appearing or toxic-appearing.   HENT:      Head: Normocephalic and atraumatic.   Eyes:      Extraocular Movements: Extraocular movements intact.      Pupils: Pupils are equal, round, and reactive to light.   Cardiovascular:      Rate and Rhythm: Normal rate.      Heart sounds: Normal heart sounds.   Pulmonary:       Effort: Pulmonary effort is normal. No tachypnea.      Breath sounds: Normal breath sounds.   Chest:      Chest wall: Tenderness present. No mass or deformity.          Comments:     AREA OF Palpable tenderness  Musculoskeletal:         General: Normal range of motion.      Cervical back: Normal range of motion and neck supple.      Right lower leg: No tenderness. No edema.      Left lower leg: No tenderness. No edema.   Lymphadenopathy:      Cervical: No cervical adenopathy.   Skin:     General: Skin is warm and dry.      Capillary Refill: Capillary refill takes less than 2 seconds.      Findings: No rash.   Neurological:      General: No focal deficit present.      Mental Status: He is alert and oriented to person, place, and time.      Cranial Nerves: No cranial nerve deficit.      Motor: No weakness.   Psychiatric:         Mood and Affect: Mood normal.         Behavior: Behavior normal.          Labs Reviewed   CBC WITH AUTO DIFFERENTIAL       Result Value    WBC 5.9      nRBC 0.0      RBC 5.06      Hemoglobin 14.3      Hematocrit 42.7      MCV 84      MCH 28.3      MCHC 33.5      RDW 12.4      Platelets 207      Neutrophils % 60.7      Immature Granulocytes %, Automated 0.3      Lymphocytes % 30.2      Monocytes % 7.5      Eosinophils % 1.0      Basophils % 0.3      Neutrophils Absolute 3.55      Immature Granulocytes Absolute, Automated 0.02      Lymphocytes Absolute 1.77      Monocytes Absolute 0.44      Eosinophils Absolute 0.06      Basophils Absolute 0.02     URINALYSIS WITH REFLEX CULTURE AND MICROSCOPIC    Narrative:     The following orders were created for panel order Urinalysis with Reflex Culture and Microscopic.  Procedure                               Abnormality         Status                     ---------                               -----------         ------                     Urinalysis with Reflex C...[[id number]]                                                 Extra Urine Pizano  Tube[[id number]]                                                         Please view results for these tests on the individual orders.   BASIC METABOLIC PANEL   MAGNESIUM   LIPASE   HEPATIC FUNCTION PANEL   TROPONIN SERIES- (INITIAL, 1 HR)    Narrative:     The following orders were created for panel order Troponin I Series, High Sensitivity (0, 1 HR).  Procedure                               Abnormality         Status                     ---------                               -----------         ------                     Troponin I, High Sensiti...[[id number]]                      In process                 Troponin, High Sensitivi...[[id number]]                                                   Please view results for these tests on the individual orders.   D-DIMER, VTE EXCLUSION   PROTIME-INR   APTT   SARS-COV-2 PCR   INFLUENZA A AND B PCR   URINALYSIS WITH REFLEX CULTURE AND MICROSCOPIC   EXTRA URINE GRAY TUBE   SERIAL TROPONIN-INITIAL   SERIAL TROPONIN, 1 HOUR        XR chest 1 view    (Results Pending)        Procedures     Medical Decision Making  Differential diagnosis chest wall pain myocardial ischemia MI pneumonia pleurisy PE.  Complete cardiac workup was ordered.  EKG was unremarkable patient had aspirin IV Hep-Lock cardiac monitoring here in the emergency department developed no arrhythmias.  Troponin was normal at 01 hours.  Urinalysis was clear COVID and flu testing were also negative.  Liver function testing was normal metabolic panel was normal D-dimer was negative magnesium was 2.02.  His INR is 1.3 had a normal white blood count and chest x-ray was negative.  Is a very low heart score is 0.  He will be referred to Dr. Moore for cardiology evaluation and stress testing.    Amount and/or Complexity of Data Reviewed  ECG/medicine tests: independent interpretation performed.     Details: Twelve-lead EKG showed sinus rhythm at 91/min there is poor R wave progression but no acute ST segment elevation or  depressions at this time         Diagnoses as of 02/02/25 1448   Atypical chest pain                    Gene Joshi MD  02/02/25 1445

## 2025-02-03 LAB — HOLD SPECIMEN: NORMAL

## 2025-02-04 ENCOUNTER — OFFICE VISIT (OUTPATIENT)
Dept: CARDIOLOGY | Facility: CLINIC | Age: 40
End: 2025-02-04
Payer: OTHER GOVERNMENT

## 2025-02-04 VITALS
HEART RATE: 90 BPM | OXYGEN SATURATION: 98 % | BODY MASS INDEX: 32.16 KG/M2 | HEIGHT: 71 IN | DIASTOLIC BLOOD PRESSURE: 82 MMHG | WEIGHT: 229.7 LBS | SYSTOLIC BLOOD PRESSURE: 144 MMHG

## 2025-02-04 DIAGNOSIS — R60.0 EDEMA OF RIGHT UPPER EXTREMITY: ICD-10-CM

## 2025-02-04 DIAGNOSIS — R06.83 SNORING: ICD-10-CM

## 2025-02-04 DIAGNOSIS — Z78.9 ALCOHOL USE: ICD-10-CM

## 2025-02-04 DIAGNOSIS — R07.9 CHEST PAIN, UNSPECIFIED TYPE: Primary | ICD-10-CM

## 2025-02-04 DIAGNOSIS — F17.200 TOBACCO USE DISORDER: ICD-10-CM

## 2025-02-04 LAB
ATRIAL RATE: 91 BPM
P AXIS: 53 DEGREES
P OFFSET: 193 MS
P ONSET: 141 MS
PR INTERVAL: 154 MS
Q ONSET: 218 MS
QRS COUNT: 15 BEATS
QRS DURATION: 96 MS
QT INTERVAL: 360 MS
QTC CALCULATION(BAZETT): 442 MS
QTC FREDERICIA: 414 MS
R AXIS: 11 DEGREES
T AXIS: 40 DEGREES
T OFFSET: 398 MS
VENTRICULAR RATE: 91 BPM

## 2025-02-04 PROCEDURE — 3008F BODY MASS INDEX DOCD: CPT | Performed by: STUDENT IN AN ORGANIZED HEALTH CARE EDUCATION/TRAINING PROGRAM

## 2025-02-04 PROCEDURE — 99406 BEHAV CHNG SMOKING 3-10 MIN: CPT | Performed by: STUDENT IN AN ORGANIZED HEALTH CARE EDUCATION/TRAINING PROGRAM

## 2025-02-04 PROCEDURE — 93000 ELECTROCARDIOGRAM COMPLETE: CPT | Performed by: STUDENT IN AN ORGANIZED HEALTH CARE EDUCATION/TRAINING PROGRAM

## 2025-02-04 PROCEDURE — 99204 OFFICE O/P NEW MOD 45 MIN: CPT | Performed by: STUDENT IN AN ORGANIZED HEALTH CARE EDUCATION/TRAINING PROGRAM

## 2025-02-04 NOTE — PROGRESS NOTES
Chief Complaint   Patient presents with    Hospital Follow-up     ER follow up - chest pain     HPI:  I was requested by Dr. Escalante to evaluate this patient in consultation for cardiac assessment.    Mr. Nik Cota is a 39 y.o. year old non-smoker (chews tobacco) EtOH male patient with past medical history significant for chest pain, obesity, snoring, nicotine dependence, alcohol abuse. Coming for assessment of chest pain. He was seen in there ED [facility] on 02/2025 complaining of some chest discomfort around Thanksgiving time. He states that his chest pain is continuous, lasts for days, worse with palpation and worse while moving his left arm, not associated with exertion. Notably, he also states that he felt like a panic attack while going to the ER, and that he believes this pain is related to the muscle on his chest. He has seen his primary care physician at the [facility] who did an EKG which was normal and was told if he had any more episodes were turned to the emergency department directly. For the month, he has had some vague discomfort in the left shoulder chest area. He had a transient rapid heartbeat but was anxious at that time. Notably, he had some flulike symptoms earlier in January but those resolved. He denied shortness of breath, leg edema, lightheadedness, headaches, fever, chills, orthopnea, paroxysmal nocturnal dyspnea or syncope.  There is no family history of cardiac disease.    EKG shows normal sinus rhythm with no signs of acute ischemic changes.     Past Medical History  Past Medical History:   Diagnosis Date    Labral tear of long head of biceps tendon, right, sequela        Past Surgical History  Past Surgical History:   Procedure Laterality Date    EYE SURGERY Left     multiple    ROTATOR CUFF REPAIR Right        Past Family History  Family History   Problem Relation Name Age of Onset    Macular degeneration Brother      Lung cancer Maternal Grandfather         Allergy History  No  Known Allergies    Past Social History  Social History     Socioeconomic History    Marital status:      Spouse name: Denisa   Tobacco Use    Smoking status: Never     Passive exposure: Past    Smokeless tobacco: Current     Types: Chew    Tobacco comments:     Chewing a can a day, not interested in quitting   Vaping Use    Vaping status: Never Used   Substance and Sexual Activity    Alcohol use: Yes     Comment: daily    Drug use: Never    Sexual activity: Yes     Partners: Female     Birth control/protection: None       Social History     Tobacco Use   Smoking Status Never    Passive exposure: Past   Smokeless Tobacco Current    Types: Chew   Tobacco Comments    Chewing a can a day, not interested in quitting       Review of Systems:  Constitutional:  Negative for chills and fever.   HENT: Negative.     Eyes: Negative.    Respiratory:  Positive for chest tightness. Negative for cough and shortness of breath.    Cardiovascular:  Positive for chest pain and palpitations. Negative for leg swelling.   Gastrointestinal:  Negative for abdominal pain, nausea and vomiting.   Genitourinary:  Negative for flank pain and frequency.   Musculoskeletal:  Positive for arthralgias and myalgias.   Skin:  Negative for rash.   Neurological:  Positive for light-headedness. Negative for dizziness.   Hematological: Negative.    Psychiatric/Behavioral: Negative.     All other systems reviewed and are negative.    Objective Data:  Last Recorded Vitals:  There were no vitals filed for this visit.    Last Labs:  CBC - 2/2/2025:  1:02 PM  5.9 14.3 207    42.7      CMP - 2/2/2025:  1:02 PM  9.3 7.4 29 --- 0.7   _ 4.7 55 60      PTT - 2/2/2025:  1:02 PM  1.2   13.5 30     TROPHS   Date/Time Value Ref Range Status   02/02/2025 02:15 PM 3 0 - 20 ng/L Final   02/02/2025 01:02 PM 3 0 - 20 ng/L Final     HGBA1C   Date/Time Value Ref Range Status   03/19/2024 09:28 AM 5.5 see below % Final     LDLCALC   Date/Time Value Ref Range Status    03/19/2024 09:28  <=99 mg/dL Final     Comment:                                 Near   Borderline      AGE      Desirable  Optimal    High     High     Very High     0-19 Y     0 - 109     ---    110-129   >/= 130     ----    20-24 Y     0 - 119     ---    120-159   >/= 160     ----      >24 Y     0 -  99   100-129  130-159   160-189     >/=190       VLDL   Date/Time Value Ref Range Status   03/19/2024 09:28 AM 35 0 - 40 mg/dL Final        Patient Medications:  No outpatient encounter medications on file as of 2/4/2025.     No facility-administered encounter medications on file as of 2/4/2025.       Physical Exam:  General: alert, oriented and in no acute distress  HEENT: NC/AT; EOMI; PERRLA, external ear is normal  Neck: supple; trachea midline; no masses; no JVD  Chest: clear breath sounds bilaterally; no wheezing  Cardio: regular rhythm, S1S2 normal, no murmurs  Abdomen: Soft, non-tender, non-distension, no organomegaly  Extremities: no clubbing/cyanosis/edema  Neuro: Grossly intact     Psychiatric: Normal mood and affect     Past Cardiology Results (Last 3 Years):  EKG:  ECG 12 lead (Clinic Performed) 02/04/2025      ECG 12 lead 02/02/2025    Echo:  Echo Results:  No results found for this or any previous visit from the past 365 days.     Cath:  No results found for this or any previous visit from the past 1095 days.    CV NCDR CATHPCI V5 COLLECTION FORM   Stress Test:  No results found for this or any previous visit from the past 1095 days.    Cardiac Imaging:  No results found for this or any previous visit from the past 1095 days.       Assessment/Plan     Mr. Nik Cota is a 39 y.o. year old non-smoker (current vaping) EtOH male patient with past medical history significant for chest pain, obesity, snoring, nicotine dependence, alcohol abuse. Coming for assessment of chest pain. He was seen in there ED [facility] on 02/2025 complaining of some chest discomfort around Thanksgiving time. He has  seen his primary care physician at the [facility] who did an EKG which was normal and was told if he had any more episodes were turned to the emergency department directly. For the month, he has had some vague discomfort in the left shoulder chest area. He had a transient rapid heartbeat but was anxious at that time. Notably, he had some flulike symptoms earlier in January but those resolved. He denied shortness of breath, leg edema, lightheadedness, headaches, fever, chills, orthopnea, paroxysmal nocturnal dyspnea or syncope.  There is no family history of cardiac disease.      Assessment    # Atypical chest pain  - He states that his chest pain is continuous, lasts for days, worse with palpation and worse while moving his left arm, not associated with exertion. Notably, he also states that he felt like a panic attack while going to the ER, and that he believes this pain is related to the muscle on his chest.   - For the month, he has had some vague discomfort in the left shoulder chest area. He had a transient rapid heartbeat but was anxious at that time. Notably, he had some flulike symptoms earlier in January but those resolved.  - EKG shows normal sinus rhythm with no signs of acute ischemic changes.  - We had a thorough conversation with the patient about the natural history of atherosclerosis and the importance for commitment with healthy lifestyle, including but not limited to healthy diet, regular exercises, avoid sedentary and compliance to medication.   - We will request treadmill nuclear stress test, echocardiogram, 48h holter monitor, CT calcium scoring.  - Follow up after tests.      # Nicotine dependence / Alcohol abuse  - We had a thorough conversation with the patient (~3min) addressing the hazard risks for nicotine dependence, including but not limited to heart attack, stroke and cancer.     We have discussed the most common side effects of the prescribed medications, indications, drug  interactions, risks, complications, and alternatives of medications/therapeutics were explained and discussed. The patient has been requested to monitor closely for any untoward side effects or complications of medications. The patient has been strongly advised to be compliant with the recommendations, all the questions and concerns have been addressed. The patient has been also instructed to call, to return sooner or to go to the emergency department if symptoms persist or get worsen. The patient voiced understanding and denies any further questions at this time.     This note was transcribed using the Dragon Dictation system. There may be grammatical, punctuation, or verbiage errors that occur with voice recognition programs.    Counseling greater than 50% of visit regarding all cardiac issues.    Thank you, Dr. Escalante, for allowing me to participate in the care of this patient. Please do not hesitate to contact me with any further questions or concerns.    Mo Moore MD  Cardiology

## 2025-02-20 ENCOUNTER — HOSPITAL ENCOUNTER (OUTPATIENT)
Dept: RADIOLOGY | Facility: HOSPITAL | Age: 40
Discharge: HOME | End: 2025-02-20
Payer: OTHER GOVERNMENT

## 2025-02-20 ENCOUNTER — HOSPITAL ENCOUNTER (OUTPATIENT)
Dept: CARDIOLOGY | Facility: HOSPITAL | Age: 40
Discharge: HOME | End: 2025-02-20
Payer: OTHER GOVERNMENT

## 2025-02-20 ENCOUNTER — TELEPHONE (OUTPATIENT)
Dept: CARDIOLOGY | Facility: CLINIC | Age: 40
End: 2025-02-20
Payer: OTHER GOVERNMENT

## 2025-02-20 VITALS — WEIGHT: 230 LBS | BODY MASS INDEX: 32.2 KG/M2 | HEIGHT: 71 IN

## 2025-02-20 DIAGNOSIS — R07.9 CHEST PAIN, UNSPECIFIED TYPE: ICD-10-CM

## 2025-02-20 LAB
AORTIC VALVE MEAN GRADIENT: 5 MMHG
AORTIC VALVE PEAK VELOCITY: 1.35 M/S
AV PEAK GRADIENT: 7 MMHG
AVA (PEAK VEL): 2.26 CM2
AVA (VTI): 2.34 CM2
BODY SURFACE AREA: 2.29 M2
EJECTION FRACTION APICAL 4 CHAMBER: 54.8
EJECTION FRACTION: 60 %
LEFT ATRIUM VOLUME AREA LENGTH INDEX BSA: 12.7 ML/M2
LEFT VENTRICLE INTERNAL DIMENSION DIASTOLE: 4.79 CM (ref 3.5–6)
LEFT VENTRICULAR OUTFLOW TRACT DIAMETER: 2.1 CM
LV EJECTION FRACTION BIPLANE: 51 %
MITRAL VALVE E/A RATIO: 1.05
RIGHT VENTRICLE FREE WALL PEAK S': 14.3 CM/S
TRICUSPID ANNULAR PLANE SYSTOLIC EXCURSION: 2.2 CM

## 2025-02-20 PROCEDURE — A9502 TC99M TETROFOSMIN: HCPCS | Performed by: STUDENT IN AN ORGANIZED HEALTH CARE EDUCATION/TRAINING PROGRAM

## 2025-02-20 PROCEDURE — 9420000001 HC RT PATIENT EDUCATION 5 MIN

## 2025-02-20 PROCEDURE — 93017 CV STRESS TEST TRACING ONLY: CPT

## 2025-02-20 PROCEDURE — 78452 HT MUSCLE IMAGE SPECT MULT: CPT | Performed by: RADIOLOGY

## 2025-02-20 PROCEDURE — 3430000001 HC RX 343 DIAGNOSTIC RADIOPHARMACEUTICALS: Performed by: STUDENT IN AN ORGANIZED HEALTH CARE EDUCATION/TRAINING PROGRAM

## 2025-02-20 PROCEDURE — 78452 HT MUSCLE IMAGE SPECT MULT: CPT

## 2025-02-20 PROCEDURE — 93018 CV STRESS TEST I&R ONLY: CPT | Performed by: STUDENT IN AN ORGANIZED HEALTH CARE EDUCATION/TRAINING PROGRAM

## 2025-02-20 PROCEDURE — 93016 CV STRESS TEST SUPVJ ONLY: CPT | Performed by: STUDENT IN AN ORGANIZED HEALTH CARE EDUCATION/TRAINING PROGRAM

## 2025-02-20 PROCEDURE — 93306 TTE W/DOPPLER COMPLETE: CPT

## 2025-02-20 PROCEDURE — 93306 TTE W/DOPPLER COMPLETE: CPT | Performed by: INTERNAL MEDICINE

## 2025-02-20 PROCEDURE — 93225 XTRNL ECG REC<48 HRS REC: CPT

## 2025-02-20 RX ADMIN — TETROFOSMIN 10.5 MILLICURIE: 0.23 INJECTION, POWDER, LYOPHILIZED, FOR SOLUTION INTRAVENOUS at 08:02

## 2025-02-20 RX ADMIN — TETROFOSMIN 34 MILLICURIE: 0.23 INJECTION, POWDER, LYOPHILIZED, FOR SOLUTION INTRAVENOUS at 09:25

## 2025-02-20 NOTE — TELEPHONE ENCOUNTER
----- Message from Mo Moore sent at 2/20/2025 12:21 PM EST -----  Please let the patient know that I have reviewed this test and the results will be discussed in detail at the next appointment. No major concerns at this point.  ----- Message -----  From: Interface, Radiology Results In  Sent: 2/20/2025  11:36 AM EST  To: Mo Moore MD

## 2025-02-25 LAB — BODY SURFACE AREA: 2.29 M2

## 2025-02-26 ENCOUNTER — TELEPHONE (OUTPATIENT)
Dept: CARDIOLOGY | Facility: CLINIC | Age: 40
End: 2025-02-26
Payer: OTHER GOVERNMENT

## 2025-02-26 NOTE — TELEPHONE ENCOUNTER
----- Message from Elise GARCIA sent at 2/26/2025  1:39 PM EST -----  Patient notified  ----- Message -----  From: Elise Del Valle MA  Sent: 2/26/2025   1:39 PM EST  To: [facility] Clinical Support Staff    Patient notified  ----- Message -----  From: Sarah E Markley, LPN  Sent: 2/25/2025   4:03 PM EST  To: [facility] Clinical Support Staff    Called pt and left message.  ----- Message -----  From: Mo Moore MD  Sent: 2/25/2025   1:47 PM EST  To: [facility] Clinical Support Staff    Please let the patient know that I have reviewed this test and the result was normal. The patient should keep current medication and normal activities at this point.  ----- Message -----  From: Mo Moore MD  Sent: 2/25/2025  11:51 AM EST  To: Mo Moore MD

## 2025-03-04 ENCOUNTER — HOSPITAL ENCOUNTER (OUTPATIENT)
Dept: RADIOLOGY | Facility: HOSPITAL | Age: 40
Discharge: HOME | End: 2025-03-04
Payer: OTHER GOVERNMENT

## 2025-03-04 DIAGNOSIS — R07.9 CHEST PAIN, UNSPECIFIED TYPE: ICD-10-CM

## 2025-03-04 PROCEDURE — 75571 CT HRT W/O DYE W/CA TEST: CPT

## 2025-03-07 ENCOUNTER — APPOINTMENT (OUTPATIENT)
Dept: CARDIOLOGY | Facility: CLINIC | Age: 40
End: 2025-03-07
Payer: OTHER GOVERNMENT

## 2025-04-01 ENCOUNTER — APPOINTMENT (OUTPATIENT)
Dept: CARDIOLOGY | Facility: CLINIC | Age: 40
End: 2025-04-01
Payer: OTHER GOVERNMENT

## 2025-09-22 ENCOUNTER — APPOINTMENT (OUTPATIENT)
Dept: UROLOGY | Facility: CLINIC | Age: 40
End: 2025-09-22
Payer: OTHER GOVERNMENT